# Patient Record
Sex: MALE | Race: WHITE | NOT HISPANIC OR LATINO | Employment: FULL TIME | URBAN - METROPOLITAN AREA
[De-identification: names, ages, dates, MRNs, and addresses within clinical notes are randomized per-mention and may not be internally consistent; named-entity substitution may affect disease eponyms.]

---

## 2024-02-24 ENCOUNTER — HOSPITAL ENCOUNTER (INPATIENT)
Facility: HOSPITAL | Age: 49
LOS: 3 days | Discharge: HOME | DRG: 381 | End: 2024-02-28
Attending: EMERGENCY MEDICINE | Admitting: INTERNAL MEDICINE
Payer: COMMERCIAL

## 2024-02-24 ENCOUNTER — APPOINTMENT (OUTPATIENT)
Dept: CARDIOLOGY | Facility: HOSPITAL | Age: 49
DRG: 381 | End: 2024-02-24
Payer: COMMERCIAL

## 2024-02-24 DIAGNOSIS — G45.8 OTHER TRANSIENT CEREBRAL ISCHEMIC ATTACKS AND RELATED SYNDROMES: ICD-10-CM

## 2024-02-24 DIAGNOSIS — K92.1 GASTROINTESTINAL HEMORRHAGE WITH MELENA: ICD-10-CM

## 2024-02-24 DIAGNOSIS — R55 SYNCOPE, UNSPECIFIED SYNCOPE TYPE: ICD-10-CM

## 2024-02-24 DIAGNOSIS — K92.2 GASTROINTESTINAL HEMORRHAGE, UNSPECIFIED GASTROINTESTINAL HEMORRHAGE TYPE: Primary | ICD-10-CM

## 2024-02-24 DIAGNOSIS — G45.9 TIA (TRANSIENT ISCHEMIC ATTACK): ICD-10-CM

## 2024-02-24 LAB
ABO GROUP (TYPE) IN BLOOD: NORMAL
ABO GROUP (TYPE) IN BLOOD: NORMAL
ALBUMIN SERPL BCP-MCNC: 3.3 G/DL (ref 3.4–5)
ALP SERPL-CCNC: 72 U/L (ref 33–120)
ALT SERPL W P-5'-P-CCNC: 16 U/L (ref 10–52)
ANION GAP SERPL CALC-SCNC: 10 MMOL/L (ref 10–20)
ANTIBODY SCREEN: NORMAL
AST SERPL W P-5'-P-CCNC: 8 U/L (ref 9–39)
BASOPHILS # BLD AUTO: 0.03 X10*3/UL (ref 0–0.1)
BASOPHILS NFR BLD AUTO: 0.2 %
BILIRUB SERPL-MCNC: 0.6 MG/DL (ref 0–1.2)
BUN SERPL-MCNC: 53 MG/DL (ref 6–23)
CALCIUM SERPL-MCNC: 8 MG/DL (ref 8.6–10.3)
CARDIAC TROPONIN I PNL SERPL HS: 71 NG/L (ref 0–20)
CARDIAC TROPONIN I PNL SERPL HS: 96 NG/L (ref 0–20)
CHLORIDE SERPL-SCNC: 112 MMOL/L (ref 98–107)
CO2 SERPL-SCNC: 23 MMOL/L (ref 21–32)
CREAT SERPL-MCNC: 1.11 MG/DL (ref 0.5–1.3)
EGFRCR SERPLBLD CKD-EPI 2021: 81 ML/MIN/1.73M*2
EOSINOPHIL # BLD AUTO: 0 X10*3/UL (ref 0–0.7)
EOSINOPHIL NFR BLD AUTO: 0 %
ERYTHROCYTE [DISTWIDTH] IN BLOOD BY AUTOMATED COUNT: 12.1 % (ref 11.5–14.5)
GLUCOSE SERPL-MCNC: 159 MG/DL (ref 74–99)
HCT VFR BLD AUTO: 30.9 % (ref 41–52)
HCT VFR BLD AUTO: 31.3 % (ref 41–52)
HGB BLD-MCNC: 10.3 G/DL (ref 13.5–17.5)
HGB BLD-MCNC: 10.7 G/DL (ref 13.5–17.5)
HOLD SPECIMEN: NORMAL
IMM GRANULOCYTES # BLD AUTO: 0.11 X10*3/UL (ref 0–0.7)
IMM GRANULOCYTES NFR BLD AUTO: 0.7 % (ref 0–0.9)
INR PPP: 1.3 (ref 0.9–1.1)
LACTATE SERPL-SCNC: 1.9 MMOL/L (ref 0.4–2)
LACTATE SERPL-SCNC: 2.3 MMOL/L (ref 0.4–2)
LYMPHOCYTES # BLD AUTO: 1.61 X10*3/UL (ref 1.2–4.8)
LYMPHOCYTES NFR BLD AUTO: 10.6 %
MCH RBC QN AUTO: 31.9 PG (ref 26–34)
MCHC RBC AUTO-ENTMCNC: 34.2 G/DL (ref 32–36)
MCV RBC AUTO: 93 FL (ref 80–100)
MONOCYTES # BLD AUTO: 0.69 X10*3/UL (ref 0.1–1)
MONOCYTES NFR BLD AUTO: 4.5 %
NEUTROPHILS # BLD AUTO: 12.76 X10*3/UL (ref 1.2–7.7)
NEUTROPHILS NFR BLD AUTO: 84 %
NRBC BLD-RTO: 0 /100 WBCS (ref 0–0)
PLATELET # BLD AUTO: 324 X10*3/UL (ref 150–450)
POTASSIUM SERPL-SCNC: 4.3 MMOL/L (ref 3.5–5.3)
PROT SERPL-MCNC: 5.5 G/DL (ref 6.4–8.2)
PROTHROMBIN TIME: 14.1 SECONDS (ref 9.8–12.8)
RBC # BLD AUTO: 3.35 X10*6/UL (ref 4.5–5.9)
RH FACTOR (ANTIGEN D): NORMAL
RH FACTOR (ANTIGEN D): NORMAL
SODIUM SERPL-SCNC: 141 MMOL/L (ref 136–145)
WBC # BLD AUTO: 15.2 X10*3/UL (ref 4.4–11.3)

## 2024-02-24 PROCEDURE — 36415 COLL VENOUS BLD VENIPUNCTURE: CPT | Performed by: EMERGENCY MEDICINE

## 2024-02-24 PROCEDURE — 99223 1ST HOSP IP/OBS HIGH 75: CPT | Performed by: INTERNAL MEDICINE

## 2024-02-24 PROCEDURE — 96375 TX/PRO/DX INJ NEW DRUG ADDON: CPT

## 2024-02-24 PROCEDURE — 93005 ELECTROCARDIOGRAM TRACING: CPT

## 2024-02-24 PROCEDURE — C9113 INJ PANTOPRAZOLE SODIUM, VIA: HCPCS | Performed by: EMERGENCY MEDICINE

## 2024-02-24 PROCEDURE — 84484 ASSAY OF TROPONIN QUANT: CPT | Performed by: INTERNAL MEDICINE

## 2024-02-24 PROCEDURE — 99285 EMERGENCY DEPT VISIT HI MDM: CPT | Mod: 25

## 2024-02-24 PROCEDURE — 86901 BLOOD TYPING SEROLOGIC RH(D): CPT | Performed by: EMERGENCY MEDICINE

## 2024-02-24 PROCEDURE — 86920 COMPATIBILITY TEST SPIN: CPT

## 2024-02-24 PROCEDURE — G0378 HOSPITAL OBSERVATION PER HR: HCPCS

## 2024-02-24 PROCEDURE — 85025 COMPLETE CBC W/AUTO DIFF WBC: CPT | Performed by: EMERGENCY MEDICINE

## 2024-02-24 PROCEDURE — 85018 HEMOGLOBIN: CPT | Performed by: INTERNAL MEDICINE

## 2024-02-24 PROCEDURE — 2500000004 HC RX 250 GENERAL PHARMACY W/ HCPCS (ALT 636 FOR OP/ED): Performed by: INTERNAL MEDICINE

## 2024-02-24 PROCEDURE — 84484 ASSAY OF TROPONIN QUANT: CPT | Performed by: EMERGENCY MEDICINE

## 2024-02-24 PROCEDURE — 96360 HYDRATION IV INFUSION INIT: CPT

## 2024-02-24 PROCEDURE — 80053 COMPREHEN METABOLIC PANEL: CPT | Performed by: EMERGENCY MEDICINE

## 2024-02-24 PROCEDURE — 83605 ASSAY OF LACTIC ACID: CPT | Performed by: EMERGENCY MEDICINE

## 2024-02-24 PROCEDURE — 96374 THER/PROPH/DIAG INJ IV PUSH: CPT | Mod: 59

## 2024-02-24 PROCEDURE — 2500000004 HC RX 250 GENERAL PHARMACY W/ HCPCS (ALT 636 FOR OP/ED): Performed by: EMERGENCY MEDICINE

## 2024-02-24 PROCEDURE — 82947 ASSAY GLUCOSE BLOOD QUANT: CPT

## 2024-02-24 PROCEDURE — 85610 PROTHROMBIN TIME: CPT | Performed by: EMERGENCY MEDICINE

## 2024-02-24 RX ORDER — LORATADINE 10 MG/1
10 TABLET ORAL DAILY
COMMUNITY

## 2024-02-24 RX ORDER — ASPIRIN 325 MG
325 TABLET, DELAYED RELEASE (ENTERIC COATED) ORAL NIGHTLY
COMMUNITY
End: 2024-02-28 | Stop reason: HOSPADM

## 2024-02-24 RX ORDER — PANTOPRAZOLE SODIUM 40 MG/10ML
40 INJECTION, POWDER, LYOPHILIZED, FOR SOLUTION INTRAVENOUS ONCE
Status: COMPLETED | OUTPATIENT
Start: 2024-02-24 | End: 2024-02-24

## 2024-02-24 RX ORDER — ONDANSETRON HYDROCHLORIDE 2 MG/ML
4 INJECTION, SOLUTION INTRAVENOUS EVERY 6 HOURS PRN
Status: DISCONTINUED | OUTPATIENT
Start: 2024-02-24 | End: 2024-02-28 | Stop reason: HOSPADM

## 2024-02-24 RX ORDER — PANTOPRAZOLE SODIUM 40 MG/10ML
40 INJECTION, POWDER, LYOPHILIZED, FOR SOLUTION INTRAVENOUS 2 TIMES DAILY
Status: DISCONTINUED | OUTPATIENT
Start: 2024-02-24 | End: 2024-02-28

## 2024-02-24 RX ORDER — ACETAMINOPHEN 325 MG/1
650 TABLET ORAL EVERY 6 HOURS PRN
Status: DISCONTINUED | OUTPATIENT
Start: 2024-02-24 | End: 2024-02-28 | Stop reason: HOSPADM

## 2024-02-24 RX ORDER — SODIUM CHLORIDE 9 MG/ML
125 INJECTION, SOLUTION INTRAVENOUS CONTINUOUS
Status: DISCONTINUED | OUTPATIENT
Start: 2024-02-24 | End: 2024-02-26

## 2024-02-24 RX ADMIN — SODIUM CHLORIDE 1000 ML: 9 INJECTION, SOLUTION INTRAVENOUS at 21:25

## 2024-02-24 RX ADMIN — SODIUM CHLORIDE, POTASSIUM CHLORIDE, SODIUM LACTATE AND CALCIUM CHLORIDE 1000 ML: 600; 310; 30; 20 INJECTION, SOLUTION INTRAVENOUS at 16:36

## 2024-02-24 RX ADMIN — PANTOPRAZOLE SODIUM 40 MG: 40 INJECTION, POWDER, FOR SOLUTION INTRAVENOUS at 16:34

## 2024-02-24 SDOH — SOCIAL STABILITY: SOCIAL INSECURITY: ARE THERE ANY APPARENT SIGNS OF INJURIES/BEHAVIORS THAT COULD BE RELATED TO ABUSE/NEGLECT?: NO

## 2024-02-24 SDOH — SOCIAL STABILITY: SOCIAL INSECURITY: DOES ANYONE TRY TO KEEP YOU FROM HAVING/CONTACTING OTHER FRIENDS OR DOING THINGS OUTSIDE YOUR HOME?: NO

## 2024-02-24 SDOH — SOCIAL STABILITY: SOCIAL INSECURITY: HAS ANYONE EVER THREATENED TO HURT YOUR FAMILY OR YOUR PETS?: NO

## 2024-02-24 SDOH — SOCIAL STABILITY: SOCIAL INSECURITY: HAVE YOU HAD THOUGHTS OF HARMING ANYONE ELSE?: NO

## 2024-02-24 SDOH — SOCIAL STABILITY: SOCIAL INSECURITY: DO YOU FEEL UNSAFE GOING BACK TO THE PLACE WHERE YOU ARE LIVING?: NO

## 2024-02-24 SDOH — SOCIAL STABILITY: SOCIAL INSECURITY: WERE YOU ABLE TO COMPLETE ALL THE BEHAVIORAL HEALTH SCREENINGS?: YES

## 2024-02-24 SDOH — SOCIAL STABILITY: SOCIAL INSECURITY: ABUSE: ADULT

## 2024-02-24 SDOH — SOCIAL STABILITY: SOCIAL INSECURITY: DO YOU FEEL ANYONE HAS EXPLOITED OR TAKEN ADVANTAGE OF YOU FINANCIALLY OR OF YOUR PERSONAL PROPERTY?: NO

## 2024-02-24 SDOH — SOCIAL STABILITY: SOCIAL INSECURITY: ARE YOU OR HAVE YOU BEEN THREATENED OR ABUSED PHYSICALLY, EMOTIONALLY, OR SEXUALLY BY ANYONE?: NO

## 2024-02-24 ASSESSMENT — LIFESTYLE VARIABLES
AUDIT-C TOTAL SCORE: 1
HOW MANY STANDARD DRINKS CONTAINING ALCOHOL DO YOU HAVE ON A TYPICAL DAY: 1 OR 2
AUDIT-C TOTAL SCORE: 1
SKIP TO QUESTIONS 9-10: 1
HOW OFTEN DO YOU HAVE A DRINK CONTAINING ALCOHOL: MONTHLY OR LESS
HOW OFTEN DO YOU HAVE 6 OR MORE DRINKS ON ONE OCCASION: NEVER

## 2024-02-24 ASSESSMENT — COGNITIVE AND FUNCTIONAL STATUS - GENERAL
MOVING TO AND FROM BED TO CHAIR: A LOT
DAILY ACTIVITIY SCORE: 13
MOVING FROM LYING ON BACK TO SITTING ON SIDE OF FLAT BED WITH BEDRAILS: A LITTLE
EATING MEALS: A LITTLE
DRESSING REGULAR LOWER BODY CLOTHING: A LOT
TOILETING: A LOT
HELP NEEDED FOR BATHING: A LOT
WALKING IN HOSPITAL ROOM: A LOT
STANDING UP FROM CHAIR USING ARMS: A LOT
PATIENT BASELINE BEDBOUND: NO
DRESSING REGULAR UPPER BODY CLOTHING: A LOT
TURNING FROM BACK TO SIDE WHILE IN FLAT BAD: A LOT
CLIMB 3 TO 5 STEPS WITH RAILING: A LOT
MOBILITY SCORE: 13
PERSONAL GROOMING: A LOT

## 2024-02-24 ASSESSMENT — ACTIVITIES OF DAILY LIVING (ADL)
HEARING - LEFT EAR: FUNCTIONAL
HEARING - RIGHT EAR: FUNCTIONAL
LACK_OF_TRANSPORTATION: NO
BATHING: INDEPENDENT
TOILETING: NEEDS ASSISTANCE
DRESSING YOURSELF: INDEPENDENT
JUDGMENT_ADEQUATE_SAFELY_COMPLETE_DAILY_ACTIVITIES: YES
PATIENT'S MEMORY ADEQUATE TO SAFELY COMPLETE DAILY ACTIVITIES?: YES
GROOMING: INDEPENDENT
ADEQUATE_TO_COMPLETE_ADL: YES
FEEDING YOURSELF: INDEPENDENT
WALKS IN HOME: INDEPENDENT

## 2024-02-24 ASSESSMENT — PAIN - FUNCTIONAL ASSESSMENT
PAIN_FUNCTIONAL_ASSESSMENT: 0-10
PAIN_FUNCTIONAL_ASSESSMENT: 0-10

## 2024-02-24 ASSESSMENT — PAIN SCALES - GENERAL
PAINLEVEL_OUTOF10: 0 - NO PAIN

## 2024-02-24 ASSESSMENT — COLUMBIA-SUICIDE SEVERITY RATING SCALE - C-SSRS
2. HAVE YOU ACTUALLY HAD ANY THOUGHTS OF KILLING YOURSELF?: NO
1. IN THE PAST MONTH, HAVE YOU WISHED YOU WERE DEAD OR WISHED YOU COULD GO TO SLEEP AND NOT WAKE UP?: NO
6. HAVE YOU EVER DONE ANYTHING, STARTED TO DO ANYTHING, OR PREPARED TO DO ANYTHING TO END YOUR LIFE?: NO

## 2024-02-24 NOTE — ED PROVIDER NOTES
Limitations to History: None     HPI:      Carson Art Jr. is a 49 y.o. with dark tarry stools since last night.  He said he began feeling unwell last night had 4 or 5 dark bowel movements and syncopized twice this morning.  Patient says has been taking a decent amount of ibuprofen secondary to some dental work but stopped taking it a few weeks ago.  He is never had dark stools before.  Denies any abdominal pain, says he feels lightheaded and dizzy.  He syncopized coming back from the bathroom and then syncopized twice for squad as well.     Additional History Obtained from: EMS and father who is at bedside    ------------------------------------------------------------------------------------------------------------------------------------------    VS: /69   Pulse (!) 108   Resp 15   SpO2 99%     Physical Exam:  Gen: Alert, pale appearing  Head/Neck: NCAT, neck w/ FROM  Eyes: EOMI, PERRL, anicteric sclerae, noninjected conjunctivae  Mouth:  MMM, no OP lesions noted  Heart: Tachycardic with a regular rhythm, no murmurs  Lungs: CTA b/l no RRW, no increased work of breathing  Abdomen: soft, NT, ND, no HSM, no palpable masses  : Dark tarry stool present on rectal exam  Musculoskeletal: no joint swelling noted  Extremities: WWP, no c/c/e, cap refill <2sec  Neurologic: Alert, symmetrical facies, phonates clearly, moves all extremities equally, responsive to touch, ambulates normally       ------------------------------------------------------------------------------------------------------------------------------------------    Medical Decision Making: Patient presents emergency room with concerns for GI bleed.  The patient has dark tarry stools could be secondary to recent increase in ibuprofen use.  His hemoglobin is 10, however he does have multiple syncopal episodes at home so will be admitted for evaluation of this GI bleed.  He does not have any bright red blood per rectum I do not think he has any  indications for a CTA at this time.  2 units were put on hold for the patient I do not think he needs a transfusion immediately at this time but he is open to that if needed.  Protonix was given, he is not on any blood thinners other than aspirin.    ED Course as of 02/24/24 1738   Sat Feb 24, 2024   1620 EKG interpreted by me at 4:20 PM shows sinus tachycardia with a rate of 124, normal axis intervals ST segments and T waves otherwise [RG]      ED Course User Index  [RG] Star Sánchez MD         Diagnoses as of 02/24/24 1738   Gastrointestinal hemorrhage, unspecified gastrointestinal hemorrhage type       Medications   pantoprazole (ProtoNix) injection 40 mg (40 mg intravenous Given 2/24/24 1634)   lactated Ringer's bolus 1,000 mL (1,000 mL intravenous New Bag 2/24/24 1636)         Discussion of Management with Other Providers:   I discussed the patient/results with: admitting doc       Star Sánchez MD  02/24/24 1738

## 2024-02-24 NOTE — H&P
"Hospital Medicine History & Physical    Patient Name: Carson Art Jr.   YOB: 1975    Subjective:    Carson Art Jr. is a 49 y.o. male who presents to the hospital with complaints of multiple syncopal episodes in the setting of melena.  Patient states overnight he was feeling an upset stomach.  He had several bowel movements.  This morning he was returning to bed after a bowel movement when he had a syncopal episode.  He noticed his stools were black and tarry.  Squad was called.  He apparently had 1-2 other syncopal episodes thereafter.  In the emergency department patient was found to have relatively stable hemodynamics, he was tachycardic.  His hemoglobin is down to 10.7 without any baseline reference.  He does endorse taking 800 mg of ibuprofen daily for about 2 weeks for dental pain, last dose was approximately 10 to 14 days ago.  He is not on any blood thinners.  No history of GERD.  Patient denies any chest pain, palpitations.    A 10 point ROS was completed and is negative expect as stated in HPI.     Past Medical History:  Venous insufficiency    Past Surgical History:  Venous ablation    Social History: Tobacco - Never, Alcohol - social drinker, Recreational Drugs - none    Family History: family history is not on file.    Objective:    /89   Pulse (!) 114   Temp 37 °C (98.6 °F) (Temporal)   Resp (!) 30   Ht 1.905 m (6' 3\")   Wt 104 kg (230 lb)   SpO2 100%   BMI 28.75 kg/m²     Physical Exam:    GENERAL: Well developed and in no apparent distress. Alert and cooperative.  HEENT: Normocephalic and atraumatic  CARDIOVASCULAR: Tachycardic  RESPIRATORY: Clear to auscultation b/l. No wheezes, rales or rhonchi. Normal effort.   ABDOMEN: Soft, non-tender. Not distended. No rebound or guarding.  EXTREMITIES: Venous stasis skin changes  NEUROLOGICAL: A&O X 3. CN II-XII are grossly intact. No focal deficits.   SKIN: Warm and dry, no lesions, no rashes.  PSYCH: Appropriate mood and " affect.      Assessment/Plan:    Patient is a 49-year-old male without any significant medical history who presents with multiple syncopal episodes in the setting of stomach upset and black-colored stools.  His labs showed decreased hemoglobin as well as increased BUN with a normal creatinine.  He is tachycardic.  Recently was on a course of NSAIDs.    Upper GI bleed  Syncopal episode  Anemia  Lactic acidosis  Non-MI troponin elevation  Obesity    At this time we will continue with IV Protonix 40 mg daily.  Will aggressively resuscitate with intravenous crystalloid fluid.  Will trend H&H.  Maintain 2 large-bore IVs.  He has been consented for blood in the emergency department and units were ordered on hold.  Consult GI.  For now he can be on a clear liquid diet, empirically make him n.p.o. after midnight  Believe his troponin elevation is secondary to the above issues, no chest pain or concerning EKG findings.  Will go ahead and trend them out.        DVT Prophylaxis: SCDs only  Code Status: Full code  Disposition: Admit to telemetry      Arash Diallo,   [facility]

## 2024-02-24 NOTE — PROGRESS NOTES
Pharmacy Medication History Review    Carson Art Jr. is a 49 y.o. male admitted for GI bleed. Pharmacy reviewed the patient's [id number] medications and allergies for accuracy.    The list below reflectives the updated PTA list. Please review each medication in order reconciliation for additional clarification and justification.  Prior to Admission medications    Medication Sig Start Date End Date Taking? Authorizing Provider   aspirin 325 mg EC tablet Take 1 tablet (325 mg) by mouth once daily at bedtime.    Historical Provider, MD   loratadine (Claritin) 10 mg tablet Take 1 tablet (10 mg) by mouth once daily.    Historical Provider, MD        The list below reflectives the updated allergy list. Please review each documented allergy for additional clarification and justification.  Allergies  Reviewed by Mary Forde RN on 2/24/2024   No Known Allergies         Below are additional concerns with the patient's PTA list.      Anabelle Martin CPhT

## 2024-02-24 NOTE — ED TRIAGE NOTES
Black tarry stools today with 3 syncopal episodes, only medications is 1 [id number] mg day, took motrin daily for about 1 week for dental procedure. Last motin 2/17, incpont 2 large black stools

## 2024-02-25 ENCOUNTER — APPOINTMENT (OUTPATIENT)
Dept: RADIOLOGY | Facility: HOSPITAL | Age: 49
DRG: 381 | End: 2024-02-25
Payer: COMMERCIAL

## 2024-02-25 PROBLEM — K92.2 GASTROINTESTINAL HEMORRHAGE, UNSPECIFIED GASTROINTESTINAL HEMORRHAGE TYPE: Status: ACTIVE | Noted: 2024-02-25

## 2024-02-25 LAB
ANION GAP SERPL CALC-SCNC: 11 MMOL/L (ref 10–20)
BNP SERPL-MCNC: 17 PG/ML (ref 0–99)
BUN SERPL-MCNC: 43 MG/DL (ref 6–23)
CALCIUM SERPL-MCNC: 7.8 MG/DL (ref 8.6–10.3)
CARDIAC TROPONIN I PNL SERPL HS: 43 NG/L (ref 0–20)
CHLORIDE SERPL-SCNC: 116 MMOL/L (ref 98–107)
CHOLEST SERPL-MCNC: 138 MG/DL (ref 0–199)
CHOLESTEROL/HDL RATIO: 6.3
CO2 SERPL-SCNC: 22 MMOL/L (ref 21–32)
CREAT SERPL-MCNC: 0.96 MG/DL (ref 0.5–1.3)
EGFRCR SERPLBLD CKD-EPI 2021: >90 ML/MIN/1.73M*2
ERYTHROCYTE [DISTWIDTH] IN BLOOD BY AUTOMATED COUNT: 12.5 % (ref 11.5–14.5)
EST. AVERAGE GLUCOSE BLD GHB EST-MCNC: 105 MG/DL
GLUCOSE BLD MANUAL STRIP-MCNC: 101 MG/DL (ref 74–99)
GLUCOSE BLD MANUAL STRIP-MCNC: 109 MG/DL (ref 74–99)
GLUCOSE BLD MANUAL STRIP-MCNC: 127 MG/DL (ref 74–99)
GLUCOSE BLD MANUAL STRIP-MCNC: 134 MG/DL (ref 74–99)
GLUCOSE SERPL-MCNC: 126 MG/DL (ref 74–99)
HBA1C MFR BLD: 5.3 %
HCT VFR BLD AUTO: 23.6 % (ref 41–52)
HCT VFR BLD AUTO: 27.2 % (ref 41–52)
HCT VFR BLD AUTO: 28.7 % (ref 41–52)
HDLC SERPL-MCNC: 21.8 MG/DL
HGB BLD-MCNC: 7.8 G/DL (ref 13.5–17.5)
HGB BLD-MCNC: 9 G/DL (ref 13.5–17.5)
HGB BLD-MCNC: 9.4 G/DL (ref 13.5–17.5)
LDLC SERPL CALC-MCNC: 85 MG/DL
MCH RBC QN AUTO: 31.6 PG (ref 26–34)
MCHC RBC AUTO-ENTMCNC: 33.1 G/DL (ref 32–36)
MCV RBC AUTO: 95 FL (ref 80–100)
NON HDL CHOLESTEROL: 116 MG/DL (ref 0–149)
NRBC BLD-RTO: 0 /100 WBCS (ref 0–0)
PLATELET # BLD AUTO: 287 X10*3/UL (ref 150–450)
POTASSIUM SERPL-SCNC: 4.2 MMOL/L (ref 3.5–5.3)
RBC # BLD AUTO: 2.85 X10*6/UL (ref 4.5–5.9)
SODIUM SERPL-SCNC: 145 MMOL/L (ref 136–145)
TRIGL SERPL-MCNC: 158 MG/DL (ref 0–149)
VLDL: 32 MG/DL (ref 0–40)
WBC # BLD AUTO: 16 X10*3/UL (ref 4.4–11.3)

## 2024-02-25 PROCEDURE — 2500000004 HC RX 250 GENERAL PHARMACY W/ HCPCS (ALT 636 FOR OP/ED)

## 2024-02-25 PROCEDURE — 83036 HEMOGLOBIN GLYCOSYLATED A1C: CPT | Performed by: INTERNAL MEDICINE

## 2024-02-25 PROCEDURE — 70450 CT HEAD/BRAIN W/O DYE: CPT

## 2024-02-25 PROCEDURE — 99223 1ST HOSP IP/OBS HIGH 75: CPT | Performed by: PHYSICIAN ASSISTANT

## 2024-02-25 PROCEDURE — 2500000001 HC RX 250 WO HCPCS SELF ADMINISTERED DRUGS (ALT 637 FOR MEDICARE OP): Performed by: INTERNAL MEDICINE

## 2024-02-25 PROCEDURE — 36415 COLL VENOUS BLD VENIPUNCTURE: CPT | Performed by: INTERNAL MEDICINE

## 2024-02-25 PROCEDURE — 84484 ASSAY OF TROPONIN QUANT: CPT | Performed by: INTERNAL MEDICINE

## 2024-02-25 PROCEDURE — C9113 INJ PANTOPRAZOLE SODIUM, VIA: HCPCS | Performed by: INTERNAL MEDICINE

## 2024-02-25 PROCEDURE — 80061 LIPID PANEL: CPT | Performed by: INTERNAL MEDICINE

## 2024-02-25 PROCEDURE — 85014 HEMATOCRIT: CPT | Performed by: INTERNAL MEDICINE

## 2024-02-25 PROCEDURE — 99223 1ST HOSP IP/OBS HIGH 75: CPT | Performed by: PSYCHIATRY & NEUROLOGY

## 2024-02-25 PROCEDURE — 70450 CT HEAD/BRAIN W/O DYE: CPT | Performed by: RADIOLOGY

## 2024-02-25 PROCEDURE — 94760 N-INVAS EAR/PLS OXIMETRY 1: CPT

## 2024-02-25 PROCEDURE — 85027 COMPLETE CBC AUTOMATED: CPT | Performed by: INTERNAL MEDICINE

## 2024-02-25 PROCEDURE — 2500000004 HC RX 250 GENERAL PHARMACY W/ HCPCS (ALT 636 FOR OP/ED): Performed by: INTERNAL MEDICINE

## 2024-02-25 PROCEDURE — 1200000002 HC GENERAL ROOM WITH TELEMETRY DAILY

## 2024-02-25 PROCEDURE — 82947 ASSAY GLUCOSE BLOOD QUANT: CPT

## 2024-02-25 PROCEDURE — 83880 ASSAY OF NATRIURETIC PEPTIDE: CPT | Performed by: INTERNAL MEDICINE

## 2024-02-25 PROCEDURE — 80048 BASIC METABOLIC PNL TOTAL CA: CPT | Performed by: INTERNAL MEDICINE

## 2024-02-25 PROCEDURE — 99233 SBSQ HOSP IP/OBS HIGH 50: CPT | Performed by: INTERNAL MEDICINE

## 2024-02-25 RX ORDER — HYDRALAZINE HYDROCHLORIDE 25 MG/1
25 TABLET, FILM COATED ORAL EVERY 6 HOURS PRN
Status: DISCONTINUED | OUTPATIENT
Start: 2024-02-27 | End: 2024-02-26

## 2024-02-25 RX ORDER — HYDRALAZINE HYDROCHLORIDE 20 MG/ML
10 INJECTION INTRAMUSCULAR; INTRAVENOUS
Status: DISCONTINUED | OUTPATIENT
Start: 2024-02-25 | End: 2024-02-26

## 2024-02-25 RX ORDER — POLYETHYLENE GLYCOL 3350 17 G/17G
17 POWDER, FOR SOLUTION ORAL DAILY PRN
Status: DISCONTINUED | OUTPATIENT
Start: 2024-02-25 | End: 2024-02-28 | Stop reason: HOSPADM

## 2024-02-25 RX ORDER — ATORVASTATIN CALCIUM 80 MG/1
80 TABLET, FILM COATED ORAL NIGHTLY
Status: DISCONTINUED | OUTPATIENT
Start: 2024-02-25 | End: 2024-02-26

## 2024-02-25 RX ORDER — ASPIRIN 81 MG/1
81 TABLET ORAL DAILY
Status: DISCONTINUED | OUTPATIENT
Start: 2024-02-26 | End: 2024-02-26

## 2024-02-25 RX ORDER — LABETALOL HYDROCHLORIDE 5 MG/ML
10 INJECTION, SOLUTION INTRAVENOUS EVERY 10 MIN PRN
Status: DISCONTINUED | OUTPATIENT
Start: 2024-02-25 | End: 2024-02-26

## 2024-02-25 RX ADMIN — SODIUM CHLORIDE 125 ML/HR: 9 INJECTION, SOLUTION INTRAVENOUS at 00:30

## 2024-02-25 RX ADMIN — ONDANSETRON 4 MG: 2 INJECTION INTRAMUSCULAR; INTRAVENOUS at 00:20

## 2024-02-25 RX ADMIN — ATORVASTATIN CALCIUM 80 MG: 80 TABLET, FILM COATED ORAL at 20:53

## 2024-02-25 RX ADMIN — ATORVASTATIN CALCIUM 80 MG: 80 TABLET, FILM COATED ORAL at 00:43

## 2024-02-25 RX ADMIN — PANTOPRAZOLE SODIUM 40 MG: 40 INJECTION, POWDER, FOR SOLUTION INTRAVENOUS at 20:52

## 2024-02-25 RX ADMIN — PANTOPRAZOLE SODIUM 40 MG: 40 INJECTION, POWDER, FOR SOLUTION INTRAVENOUS at 09:45

## 2024-02-25 SDOH — SOCIAL STABILITY: SOCIAL INSECURITY: ARE THERE ANY APPARENT SIGNS OF INJURIES/BEHAVIORS THAT COULD BE RELATED TO ABUSE/NEGLECT?: NO

## 2024-02-25 SDOH — SOCIAL STABILITY: SOCIAL INSECURITY: DO YOU FEEL UNSAFE GOING BACK TO THE PLACE WHERE YOU ARE LIVING?: NO

## 2024-02-25 SDOH — SOCIAL STABILITY: SOCIAL INSECURITY: HAS ANYONE EVER THREATENED TO HURT YOUR FAMILY OR YOUR PETS?: NO

## 2024-02-25 SDOH — SOCIAL STABILITY: SOCIAL INSECURITY: DO YOU FEEL ANYONE HAS EXPLOITED OR TAKEN ADVANTAGE OF YOU FINANCIALLY OR OF YOUR PERSONAL PROPERTY?: NO

## 2024-02-25 SDOH — SOCIAL STABILITY: SOCIAL INSECURITY: HAVE YOU HAD THOUGHTS OF HARMING ANYONE ELSE?: NO

## 2024-02-25 SDOH — SOCIAL STABILITY: SOCIAL INSECURITY: WERE YOU ABLE TO COMPLETE ALL THE BEHAVIORAL HEALTH SCREENINGS?: YES

## 2024-02-25 SDOH — SOCIAL STABILITY: SOCIAL INSECURITY: DOES ANYONE TRY TO KEEP YOU FROM HAVING/CONTACTING OTHER FRIENDS OR DOING THINGS OUTSIDE YOUR HOME?: NO

## 2024-02-25 SDOH — SOCIAL STABILITY: SOCIAL INSECURITY: ABUSE: ADULT

## 2024-02-25 SDOH — SOCIAL STABILITY: SOCIAL INSECURITY: ARE YOU OR HAVE YOU BEEN THREATENED OR ABUSED PHYSICALLY, EMOTIONALLY, OR SEXUALLY BY ANYONE?: NO

## 2024-02-25 ASSESSMENT — ENCOUNTER SYMPTOMS
TROUBLE SWALLOWING: 0
WHEEZING: 0
APPETITE CHANGE: 0
NUMBNESS: 0
CHILLS: 0
EYE PAIN: 0
SPEECH DIFFICULTY: 1
DYSURIA: 0
JOINT SWELLING: 0
ARTHRALGIAS: 0
SHORTNESS OF BREATH: 0
HEADACHES: 0
SORE THROAT: 0
WEAKNESS: 0
FEVER: 0
DIZZINESS: 0
UNEXPECTED WEIGHT CHANGE: 0
HEMATURIA: 0
COUGH: 0
CONFUSION: 0
MYALGIAS: 0

## 2024-02-25 ASSESSMENT — LIFESTYLE VARIABLES
AUDIT-C TOTAL SCORE: 1
AUDIT-C TOTAL SCORE: 1
SKIP TO QUESTIONS 9-10: 1
HOW OFTEN DO YOU HAVE A DRINK CONTAINING ALCOHOL: MONTHLY OR LESS
HOW OFTEN DO YOU HAVE 6 OR MORE DRINKS ON ONE OCCASION: NEVER
HOW MANY STANDARD DRINKS CONTAINING ALCOHOL DO YOU HAVE ON A TYPICAL DAY: 1 OR 2

## 2024-02-25 ASSESSMENT — PATIENT HEALTH QUESTIONNAIRE - PHQ9
1. LITTLE INTEREST OR PLEASURE IN DOING THINGS: NOT AT ALL
SUM OF ALL RESPONSES TO PHQ9 QUESTIONS 1 & 2: 0
2. FEELING DOWN, DEPRESSED OR HOPELESS: NOT AT ALL

## 2024-02-25 ASSESSMENT — COGNITIVE AND FUNCTIONAL STATUS - GENERAL
MOVING TO AND FROM BED TO CHAIR: A LITTLE
TURNING FROM BACK TO SIDE WHILE IN FLAT BAD: A LOT
CLIMB 3 TO 5 STEPS WITH RAILING: A LOT
MOVING FROM LYING ON BACK TO SITTING ON SIDE OF FLAT BED WITH BEDRAILS: A LITTLE
DRESSING REGULAR UPPER BODY CLOTHING: A LOT
PERSONAL GROOMING: A LITTLE
TOILETING: A LOT
DRESSING REGULAR LOWER BODY CLOTHING: A LOT
TURNING FROM BACK TO SIDE WHILE IN FLAT BAD: A LITTLE
STANDING UP FROM CHAIR USING ARMS: A LOT
DRESSING REGULAR UPPER BODY CLOTHING: A LITTLE
HELP NEEDED FOR BATHING: A LOT
HELP NEEDED FOR BATHING: A LOT
WALKING IN HOSPITAL ROOM: A LOT
MOBILITY SCORE: 17
DAILY ACTIVITIY SCORE: 17
DAILY ACTIVITIY SCORE: 12
CLIMB 3 TO 5 STEPS WITH RAILING: A LOT
MOVING FROM LYING ON BACK TO SITTING ON SIDE OF FLAT BED WITH BEDRAILS: A LITTLE
MOBILITY SCORE: 13
MOVING TO AND FROM BED TO CHAIR: A LOT
PERSONAL GROOMING: A LOT
TOILETING: A LITTLE
WALKING IN HOSPITAL ROOM: A LITTLE
DRESSING REGULAR LOWER BODY CLOTHING: A LOT
STANDING UP FROM CHAIR USING ARMS: A LITTLE
EATING MEALS: A LOT

## 2024-02-25 ASSESSMENT — PAIN SCALES - GENERAL
PAINLEVEL_OUTOF10: 0 - NO PAIN
PAINLEVEL_OUTOF10: 0 - NO PAIN

## 2024-02-25 ASSESSMENT — PAIN - FUNCTIONAL ASSESSMENT
PAIN_FUNCTIONAL_ASSESSMENT: 0-10
PAIN_FUNCTIONAL_ASSESSMENT: 0-10

## 2024-02-25 NOTE — PROGRESS NOTES
2344 Pt with new onset expressive aphasia while changing. Neuro check performed and intact. Hospitalist notified. Stroke alert called.

## 2024-02-25 NOTE — CARE PLAN
Problem: Pain  Goal: My pain/discomfort is manageable  Outcome: Progressing     Problem: Safety  Goal: Patient will be injury free during hospitalization  Outcome: Progressing  Goal: I will remain free of falls  Outcome: Progressing     Problem: Daily Care  Goal: Daily care needs are met  Outcome: Progressing     Problem: Psychosocial Needs  Goal: Demonstrates ability to cope with hospitalization/illness  Outcome: Progressing  Goal: Collaborate with me, my family, and caregiver to identify my specific goals  Outcome: Progressing  Flowsheets (Taken 2/24/2024 2038)  Cultural Requests During Hospitalization: none  Spiritual Requests During Hospitalization: none     Problem: Discharge Barriers  Goal: My discharge needs are met  Outcome: Progressing     Problem: Skin  Goal: Decreased wound size/increased tissue granulation at next dressing change  Outcome: Progressing  Flowsheets (Taken 2/24/2024 2351)  Decreased wound size/increased tissue granulation at next dressing change: Promote sleep for wound healing  Goal: Participates in plan/prevention/treatment measures  Outcome: Progressing  Flowsheets (Taken 2/24/2024 2351)  Participates in plan/prevention/treatment measures: Elevate heels  Goal: Prevent/manage excess moisture  Outcome: Progressing  Flowsheets (Taken 2/24/2024 2351)  Prevent/manage excess moisture: Cleanse incontinence/protect with barrier cream  Goal: Prevent/minimize sheer/friction injuries  Outcome: Progressing  Flowsheets (Taken 2/24/2024 2351)  Prevent/minimize sheer/friction injuries: Use pull sheet  Goal: Promote/optimize nutrition  Outcome: Progressing  Flowsheets (Taken 2/24/2024 2351)  Promote/optimize nutrition: Discuss with provider if NPO > 2 days  Goal: Promote skin healing  Outcome: Progressing  Flowsheets (Taken 2/24/2024 2351)  Promote skin healing: Assess skin/pad under line(s)/device(s)   The patient's goals for the shift include      The clinical goals for the shift include H/H remains  stable

## 2024-02-25 NOTE — SIGNIFICANT EVENT
"Stroke team note:    Called by nursing for acute onset expressive aphasia.  Last known well 10:30 pm.  Patient had transient expressive aphasia, speaking \"gibberish\", but lasting only minutes.  No other motor, sensory, visual deficits.    On exam NIH zero.  Stat CT brain without ordered and reviewed without obvious bleed.  Will place stroke order set, check imaging, and neuro consult.  Given 100% resolution of symptoms, and concern for acute GI bleed will not consider TNK at this time.      Also-  Patient continues to have hematemesis, and repeat H&H scheduled in 1 hour.  Vitals remaining stable.  If Hgb stable will give aspirin, but if dropping may need to withhold aspirin.      02/25/24 at 12:13 AM - Winston Hobbs MD    "

## 2024-02-25 NOTE — CONSULTS
Department of Internal Medicine  Gastroenterology  Consult note      Reason for Consult: upper GIB, multiple syncopal episodes, NSIAD use, melena     Chief Complaint: melena and syncope    History Obtained from: chart review and patient reports    History of Present Illness      The patient is a 49 y.o. male with signficant past medical history of venous insufficiency who presents for syncope and melena.    He states he started feeling unwell Friday during the mid afternoon.  Saturday morning he started having melena.  He states he had 20-30 episodes during the day and overnight.  He stated most were low volume liquid black diarrhea.  When EMS was called later in the day he had 2 episodes of syncope prior to arrival and 1 in the ER.  He denies hitting his head.  Otherwise he has had some mild nausea with 1 episode of vomiting last night which she was told was black.  No bright red blood.  He denies any abdominal pain, odynophagia, dysphagia, acid reflux, constipation, or hematochezia.  He typically moves his bowels 3 times daily of normal caliber and color.    Of note he had some teeth pulled about 3 weeks ago and was taking high-dose ibuprofen every day as well as a daily aspirin.  He was also on amoxicillin and Norco at that time.    Denies use of tobacco, alcohol, and recreational drugs. No blood thinner or recent changes in medication. Denies use of daily NSAIDs and Tylenol.  He works as a pharmacist at [employer]    No history of abdominal surgeries    Allergies  Patient has no known allergies.    Current Medication    Current Facility-Administered Medications:     acetaminophen (Tylenol) tablet 650 mg, 650 mg, oral, q6h PRN, Arash Diallo DO    [Held by provider] aspirin EC tablet 81 mg, 81 mg, oral, Daily, Winston Hobbs MD    atorvastatin (Lipitor) tablet 80 mg, 80 mg, oral, Nightly, Winston Hobbs MD, 80 mg at 02/25/24 0043    hydrALAZINE (Apresoline) injection 10 mg, 10 mg, intravenous, q20 min PRN  **FOLLOWED BY** [START ON 2/27/2024] hydrALAZINE (Apresoline) tablet 25 mg, 25 mg, oral, q6h PRN, Winston Hobbs MD    labetaloL (Normodyne,Trandate) injection 10 mg, 10 mg, intravenous, q10 min PRN, Winston Hobbs MD    ondansetron (Zofran) injection 4 mg, 4 mg, intravenous, q6h PRN, Jose Miguel Terrell DO, 4 mg at 02/25/24 0020    oxygen (O2) therapy, , inhalation, Continuous PRN - O2/gases, Winston Hobbs MD    pantoprazole (ProtoNix) injection 40 mg, 40 mg, intravenous, BID, Arash Diallo DO    polyethylene glycol (Glycolax, Miralax) packet 17 g, 17 g, oral, Daily PRN, Winston Hobbs MD    sodium chloride 0.9% infusion, 125 mL/hr, intravenous, Continuous, Arash Diallo DO, Last Rate: 125 mL/hr at 02/25/24 0030, 125 mL/hr at 02/25/24 0030    Past Medical History  Active Ambulatory Problems     Diagnosis Date Noted    No Active Ambulatory Problems     Resolved Ambulatory Problems     Diagnosis Date Noted    No Resolved Ambulatory Problems     Past Medical History:   Diagnosis Date    Patient denies medical problems        Past Surgical History  No past surgical history on file.    Family History  No family history on file.  No family history of stomach or colon cancer    Social History  TOBACCO:  reports that he has quit smoking. His smoking use included cigars. He has been exposed to tobacco smoke. He has never used smokeless tobacco.  ETOH:  has no history on file for alcohol use.  DRUGS:  has no history on file for drug use.  MARITAL STATUS:   OCCUPATION:    Review of Systems  Review of Systems   Constitutional:  Negative for appetite change, chills, fever and unexpected weight change.   HENT:  Negative for mouth sores, sore throat and trouble swallowing.    Eyes:  Negative for pain and visual disturbance.   Respiratory:  Negative for cough, shortness of breath and wheezing.    Cardiovascular:  Negative for chest pain.   Genitourinary:  Negative for decreased urine volume, dysuria and  "hematuria.   Musculoskeletal:  Negative for arthralgias, joint swelling and myalgias.        Endorses joint stiffness with the change in weather   Skin:  Negative for pallor and rash.   Neurological:  Positive for syncope. Negative for dizziness, weakness, numbness and headaches.   Psychiatric/Behavioral:  Negative for confusion.        PHYSICAL EXAM  VS: /78   Pulse 104   Temp 36.7 °C (98.1 °F)   Resp 18   Ht 1.905 m (6' 3\")   Wt 104 kg (230 lb)   SpO2 95%   BMI 28.75 kg/m²  Body mass index is 28.75 kg/m².  Physical Exam  Constitutional:       General: He is not in acute distress.     Appearance: Normal appearance. He is obese.   HENT:      Mouth/Throat:      Mouth: Mucous membranes are moist.      Pharynx: Oropharynx is clear.   Eyes:      General: No scleral icterus.     Extraocular Movements: Extraocular movements intact.      Conjunctiva/sclera: Conjunctivae normal.      Pupils: Pupils are equal, round, and reactive to light.   Cardiovascular:      Rate and Rhythm: Normal rate and regular rhythm.      Heart sounds: No murmur heard.  Pulmonary:      Effort: Pulmonary effort is normal.      Breath sounds: No wheezing or rhonchi.   Abdominal:      General: Bowel sounds are normal. There is no distension.      Palpations: Abdomen is soft. There is no mass.      Tenderness: There is no abdominal tenderness.      Hernia: No hernia is present.   Musculoskeletal:         General: No swelling or deformity.   Skin:     General: Skin is warm.      Coloration: Skin is not jaundiced.   Neurological:      General: No focal deficit present.      Mental Status: He is alert and oriented to person, place, and time.   Psychiatric:         Mood and Affect: Mood normal.          DATA  Recent blood work and relevant radiology studies were reviewed and discussed with the patient   Results from last 7 days   Lab Units 02/25/24  0638   WBC AUTO x10*3/uL 16.0*   RBC AUTO x10*6/uL 2.85*   HEMOGLOBIN g/dL 9.0*   HEMATOCRIT % " 27.2*   MCV fL 95   MCHC g/dL 33.1   RDW % 12.5   PLATELETS AUTO x10*3/uL 287       Results from last 72 hours   Lab Units 02/25/24  0638 02/24/24  1617   SODIUM mmol/L 145 141   POTASSIUM mmol/L 4.2 4.3   CHLORIDE mmol/L 116* 112*   CO2 mmol/L 22 23   BUN mg/dL 43* 53*   CREATININE mg/dL 0.96 1.11   CALCIUM mg/dL 7.8* 8.0*   PROTEIN TOTAL g/dL  --  5.5*   BILIRUBIN TOTAL mg/dL  --  0.6   ALK PHOS U/L  --  72   AST U/L  --  8*   ALT U/L  --  16       Results from last 72 hours   Lab Units 02/24/24  1617   INR  1.3*     RADIOLOGY REVIEW  CT brain/head without contrast 2/25/2024  IMPRESSION:  1.  No acute intracranial hemorrhage or acute territorial infarct.    ENDOSCOPIC REVIEW  NA    IMPRESSION/RECOMMENDATIONS    UGIB - hgb(2/25) 9.0, admitted with hgb 10.7, baseline unknown, prior to this admission, last hgb 15 in 2012.   NSAID and ASA use  Concern for TIA - short episode of expressive aphasia. CT head negative    PLAN  We will tentatively schedule for EGD 2/26/2024 pending stroke workup  Agree with pantoprazole 40 mg twice daily  Currently n.p.o. and should maintain if still having hematemesis otherwise clear liquids are okay until midnight than NPO  Continue supportive care per primary team    Discussed with Dr Woodson GI to follow    (Electronically signed byIvy Cameron PA-C on 2/25/2024 at 7:50 AM)    Inpatient consult to gastroenterology  Consult performed by: Ivy Cameron PA-C  Consult ordered by: Arash Diallo DO

## 2024-02-25 NOTE — PROGRESS NOTES
Occupational Therapy                 Therapy Communication Note    Patient Name: Carson Art Jr.  MRN: [id number]  Today's Date: 2/25/2024   Time 0942  Discipline: Occupational Therapy    Missed Visit Reason: Missed Visit Reason:  (Pt pending MRI /c stroke alert called this AM. Will hold eval at this time. Nursing aware.)    Missed Time: Cancel    Comment:

## 2024-02-25 NOTE — PROGRESS NOTES
"Hospital Medicine Daily Progress Note    Patient Name: Carson Art Jr.   YOB: 1975      Subjective:  Carson Art Jr. is a 49 y.o.yo male who is hospital day 2     Patient overnight had a stroke alert called due to transient change in speech. He also had hematemesis. Hgb slightly lower this morning but hemodynamics are fine.     Objective:  Recent labs, imaging, vital signs and notes from other providers were reviewed     /78   Pulse 104   Temp 36.7 °C (98.1 °F)   Resp 18   Ht 1.905 m (6' 3\")   Wt 104 kg (230 lb)   SpO2 95%   BMI 28.75 kg/m²     Physical Exam:     GENERAL: Well developed and in no apparent distress. Alert and cooperative.  HEENT: Normocephalic and atraumatic  CARDIOVASCULAR: Tachycardic  RESPIRATORY: Clear to auscultation b/l. No wheezes, rales or rhonchi. Normal effort.   ABDOMEN: Soft, non-tender. Not distended. No rebound or guarding.  EXTREMITIES: Venous stasis skin changes  NEUROLOGICAL: A&O X 3. No focal deficits.   SKIN: Warm and dry, no lesions, no rashes.  PSYCH: Appropriate mood and affect.        Assessment/Plan:     Patient is a 49-year-old male without any significant medical history who presents with multiple syncopal episodes in the setting of stomach upset and black-colored stools.  His labs showed decreased hemoglobin as well as increased BUN with a normal creatinine.  He was tachycardic. Recently was on a course of NSAIDs.     Upper GI bleed  TIA?  Syncopal episode  Anemia  Lactic acidosis  Non-MI troponin elevation  Obesity     Continue with IV Protonix 40 mg daily. Maintain 2 large-bore IVs.  He has been consented for blood in the emergency department and units were ordered on hold.  Consult GI for EGD tmrw. CLD now and NPO after mn. IVF running  Unclear what happened overnight with regards to speech. NIH 0 now. MRIs and neuro consult pending. Hold ASA for now as I believe the GIB more pressing.   Believe his troponin elevation is secondary to " the above issues, no chest pain or concerning EKG findings. Down trended.            DVT Prophylaxis: SCDs only  Code Status: Full code  Disposition: Tele, anticipate eventual dc to home with no needs       Arash Diallo, DO  [facility]

## 2024-02-25 NOTE — CONSULTS
Inpatient consult to Neurology  Consult performed by: Michael Gorman MD  Consult ordered by: Winston Hobbs MD        History Of Present Illness  Carson Art Jr. is a 49 y.o. male presenting with speech problems.  The patient was in his usual state of health until recently.  The patient states that he had an upset stomach and had several bowel movements.  On the morning of admission, he was returning to bed after bowel movement when he had a loss of consciousness.  He states that his stools were black and tarry.  EMS was called and the patient apparently had 1-2 other syncopal episodes noted.  In the ER the patient was stable but was tachycardic.  His hemoglobin had decreased to 10.7.  The patient states that he has been taking 800 mg of ibuprofen daily for about 2 weeks secondary to dental pain but his last dose was 10 to 14 days ago.  The patient was diagnosed with a GI bleed and GI has been consulted.  The patient is tentatively scheduled for endoscopy on 2/26/2024.  Last evening at 10:30 PM the patient had  A transient episode of difficulty with speech.  He states that it lasted minutes and was back to his baseline.  He denied any associated headache, nausea, vomiting, facial or extremity weakness or numbness or any other neurological problems.  The patient denies any history of stroke, seizure or head trauma.  The patient had a stroke team called.  He had a CT scan brain done that was negative for any acute process.  I did discuss the case with Dr. Hobbs.  The patient was not an IV tPA candidate as he was back to his baseline.  His NIH stroke scale score was 0 at that time.  Currently the patient is back to his baseline and denies any new neurological problems.    Past medical History  Past Medical History:   Diagnosis Date    Patient denies medical problems    The patient's past medical history significant for venous insufficiency.  Surgical History  The patient's past surgical history  "significant for venous ablation.  Social History  Social History     Tobacco Use    Smoking status: Former     Types: Cigars     Passive exposure: Past    Smokeless tobacco: Never   The patient currently does not smoke cigarettes and occasionally drinks alcohol.  He denies illicit drug use lives at home.  Allergies  Patient has no known allergies.  Medications Prior to Admission   Medication Sig Dispense Refill Last Dose    aspirin 325 mg EC tablet Take 1 tablet (325 mg) by mouth once daily at bedtime.   2/23/2024    loratadine (Claritin) 10 mg tablet Take 1 tablet (10 mg) by mouth once daily.   2/23/2024     Family History  The patient's family history is negative for any significant problem.    Review of Systems   Neurological:  Positive for speech difficulty.   All other systems reviewed and are negative.        Neurological Exam  Physical Exam  Last Recorded Vitals  Blood pressure 125/78, pulse 104, temperature 36.7 °C (98.1 °F), resp. rate 18, height 1.905 m (6' 3\"), weight 104 kg (230 lb), SpO2 95 %.    The patient is a well developed, [morbidly obese male] in no acute distress.    The patient's funduscopic examination shows no papilledema bilaterally.    The patient's extremity examination shows that the pulses are 2+ in the upper and lower extremities bilaterally and there changes consistent with venous insufficiency in the right greater than left lower extremity.      The patient's mental status testing is alert and oriented ×3 with no evidence of aphasia or dysarthria.  The patient's memory testing, fund of knowledge and concentration are all within normal limits.  The patient's cranial nerves 2, 3, 4, 5, 6, 7, 8, 9, 10, 11 and 12 are all within normal limits.  The patient's motor testing shows normal tone, bulk, and power in the upper and lower extremities bilaterally.  The patient's sensory testing is intact to light touch in the upper and lower extremities bilaterally.  The patient's cerebellar testing " is intact in the upper and lower extremities bilaterally.  The patient's station and gait were not tested as the patient feels weak and that he is a high fall risk.  The patient's reflexes are 1+ in the upper and lower extremities and symmetrical.  Relevant Results        [place] Coma Scale  Best Eye Response: Spontaneous  Best Verbal Response: Oriented  Best Motor Response: Follows commands  Mateo Coma Scale Score: 15                 I have personally reviewed the following imaging results CT brain attack head wo IV contrast    Result Date: 2/25/2024  Interpreted By:  Cleo Benjamin, STUDY: CT BRAIN ATTACK HEAD WO IV CONTRAST;  2/25/2024 12:13 am   INDICATION: Signs/Symptoms:stroke alert.   COMPARISON: None.   ACCESSION NUMBER(S): [id number]   ORDERING CLINICIAN: NASIM GRUBBS   TECHNIQUE: Noncontrast CT axial images were obtained through the brain.  Coronal and sagittal reformats were performed.   FINDINGS: The ventricles, sulci and basal cisterns are within normal limits. The grey-white matter differentiation is intact. No acute territorial infarct.  There is no mass effect or midline shift. There is no extraaxial fluid collection.  There is no intracranial hemorrhage. No depressed calvarial fracture. No air-fluid levels at the visualized paranasal sinuses. The mastoid air cells are clear.       1.  No acute intracranial hemorrhage or acute territorial infarct.         MACRO: Cleo Benjamin discussed the significance and urgency of this critical finding by telephone with  Dr. Hobbs on 2/25/2024 at 12:27 am.  (**-RCF-**) Findings:  See findings.   Signed by: Cleo Benjamin 2/25/2024 12:28 AM Dictation workstation:   [id number]       Assessment/Plan     Impression: The patient is a 49-year-old male who admitted after syncopal episodes and diagnosed with a GI hemorrhage.  The patient had brief episode where he had difficulty with speech and now he is back to his baseline.  His neurological  examination is essentially normal.  The differential diagnosis for his TIA includes intra or extracranial atherosclerotic disease, small vessel stroke or a cardiac source of embolism.    Plan: The patient needs an MRI of the brain without contrast as well as an MRA of the neck and brain.  The patient needs an echocardiogram.  The patient has an elevated LDL and needs to be on atorvastatin which has been started.  The patient will need a Zio patch for 2 weeks.  I will hold on any antiplatelet therapy for now until his GI workup has been completed.  The patient needs to continue stroke risk factor modification.   The patient needs a PT, OT, social service, rehab and speech therapy consult.  The patient needs DVT prophylaxis.  The patient needs neurochecks as per protocol.  I will send the note to Dr. Hobbs.  Thank you very much for sending me this very interesting consultation.  I discussed all these issues in detail with the patient and answered all their questions.  I will continue to follow the patient while they are in the hospital.  The patient needs follow-up with their primary care doctor within 2 weeks of discharge.  On discharge, the patient will follow up with me in the office in 4 months.      Michael Gorman MD

## 2024-02-25 NOTE — CARE PLAN
The patient's goals for the shift include  less bloody stools.    The clinical goals for the shift include decreased diarrhea.    Over the shift, the patient did not make progress toward the following goals. Barriers to progression include syncopal episodes bloody stool.  Recommendations to address these barriers include medications GI consult.

## 2024-02-25 NOTE — PROGRESS NOTES
Physical Therapy                 Therapy Communication Note    Patient Name: Carson Art Jr.  MRN: [id number]  Today's Date: 2/25/2024     Discipline: Physical Therapy    Missed Visit Reason: Missed Visit Reason: Patient placed on medical hold (Pt pending MRI /c stroke alert called this AM. Will hold eval at this time. Nursing aware.)    Missed Time: Attempt

## 2024-02-26 ENCOUNTER — APPOINTMENT (OUTPATIENT)
Dept: GASTROENTEROLOGY | Facility: HOSPITAL | Age: 49
DRG: 381 | End: 2024-02-26
Payer: COMMERCIAL

## 2024-02-26 ENCOUNTER — APPOINTMENT (OUTPATIENT)
Dept: RADIOLOGY | Facility: HOSPITAL | Age: 49
DRG: 381 | End: 2024-02-26
Payer: COMMERCIAL

## 2024-02-26 ENCOUNTER — ANESTHESIA (OUTPATIENT)
Dept: GASTROENTEROLOGY | Facility: HOSPITAL | Age: 49
DRG: 381 | End: 2024-02-26
Payer: COMMERCIAL

## 2024-02-26 ENCOUNTER — ANESTHESIA EVENT (OUTPATIENT)
Dept: GASTROENTEROLOGY | Facility: HOSPITAL | Age: 49
DRG: 381 | End: 2024-02-26
Payer: COMMERCIAL

## 2024-02-26 ENCOUNTER — APPOINTMENT (OUTPATIENT)
Dept: CARDIOLOGY | Facility: HOSPITAL | Age: 49
DRG: 381 | End: 2024-02-26
Payer: COMMERCIAL

## 2024-02-26 LAB
ERYTHROCYTE [DISTWIDTH] IN BLOOD BY AUTOMATED COUNT: 12.7 % (ref 11.5–14.5)
GLUCOSE BLD MANUAL STRIP-MCNC: 56 MG/DL (ref 74–99)
GLUCOSE BLD MANUAL STRIP-MCNC: 79 MG/DL (ref 74–99)
GLUCOSE BLD MANUAL STRIP-MCNC: 86 MG/DL (ref 74–99)
GLUCOSE BLD MANUAL STRIP-MCNC: 87 MG/DL (ref 74–99)
GLUCOSE BLD MANUAL STRIP-MCNC: 88 MG/DL (ref 74–99)
HCT VFR BLD AUTO: 23.2 % (ref 41–52)
HGB BLD-MCNC: 7.5 G/DL (ref 13.5–17.5)
HOLD SPECIMEN: NORMAL
MCH RBC QN AUTO: 31.5 PG (ref 26–34)
MCHC RBC AUTO-ENTMCNC: 32.3 G/DL (ref 32–36)
MCV RBC AUTO: 98 FL (ref 80–100)
NRBC BLD-RTO: 0 /100 WBCS (ref 0–0)
PLATELET # BLD AUTO: 220 X10*3/UL (ref 150–450)
RBC # BLD AUTO: 2.38 X10*6/UL (ref 4.5–5.9)
WBC # BLD AUTO: 9.4 X10*3/UL (ref 4.4–11.3)

## 2024-02-26 PROCEDURE — 97165 OT EVAL LOW COMPLEX 30 MIN: CPT | Mod: GO

## 2024-02-26 PROCEDURE — 70544 MR ANGIOGRAPHY HEAD W/O DYE: CPT | Performed by: RADIOLOGY

## 2024-02-26 PROCEDURE — 36415 COLL VENOUS BLD VENIPUNCTURE: CPT | Performed by: INTERNAL MEDICINE

## 2024-02-26 PROCEDURE — 85027 COMPLETE CBC AUTOMATED: CPT | Performed by: INTERNAL MEDICINE

## 2024-02-26 PROCEDURE — 70551 MRI BRAIN STEM W/O DYE: CPT | Performed by: RADIOLOGY

## 2024-02-26 PROCEDURE — 70547 MR ANGIOGRAPHY NECK W/O DYE: CPT | Mod: 59

## 2024-02-26 PROCEDURE — 82947 ASSAY GLUCOSE BLOOD QUANT: CPT

## 2024-02-26 PROCEDURE — 3700000002 HC GENERAL ANESTHESIA TIME - EACH INCREMENTAL 1 MINUTE

## 2024-02-26 PROCEDURE — 0753T DGTZ GLS MCRSCP SLD LEVEL IV: CPT | Mod: TC,PARLAB | Performed by: INTERNAL MEDICINE

## 2024-02-26 PROCEDURE — 88305 TISSUE EXAM BY PATHOLOGIST: CPT | Performed by: PATHOLOGY

## 2024-02-26 PROCEDURE — 7100000002 HC RECOVERY ROOM TIME - EACH INCREMENTAL 1 MINUTE

## 2024-02-26 PROCEDURE — 2500000004 HC RX 250 GENERAL PHARMACY W/ HCPCS (ALT 636 FOR OP/ED): Performed by: INTERNAL MEDICINE

## 2024-02-26 PROCEDURE — 70547 MR ANGIOGRAPHY NECK W/O DYE: CPT | Performed by: RADIOLOGY

## 2024-02-26 PROCEDURE — A43239 PR EDG TRANSORAL BIOPSY SINGLE/MULTIPLE: Performed by: ANESTHESIOLOGY

## 2024-02-26 PROCEDURE — 70544 MR ANGIOGRAPHY HEAD W/O DYE: CPT | Mod: 59

## 2024-02-26 PROCEDURE — 2500000005 HC RX 250 GENERAL PHARMACY W/O HCPCS: Performed by: ANESTHESIOLOGIST ASSISTANT

## 2024-02-26 PROCEDURE — 2500000004 HC RX 250 GENERAL PHARMACY W/ HCPCS (ALT 636 FOR OP/ED): Performed by: ANESTHESIOLOGIST ASSISTANT

## 2024-02-26 PROCEDURE — 94760 N-INVAS EAR/PLS OXIMETRY 1: CPT

## 2024-02-26 PROCEDURE — A43239 PR EDG TRANSORAL BIOPSY SINGLE/MULTIPLE: Performed by: ANESTHESIOLOGIST ASSISTANT

## 2024-02-26 PROCEDURE — 0DB68ZX EXCISION OF STOMACH, VIA NATURAL OR ARTIFICIAL OPENING ENDOSCOPIC, DIAGNOSTIC: ICD-10-PCS | Performed by: INTERNAL MEDICINE

## 2024-02-26 PROCEDURE — 99140 ANES COMP EMERGENCY COND: CPT | Performed by: ANESTHESIOLOGY

## 2024-02-26 PROCEDURE — 97161 PT EVAL LOW COMPLEX 20 MIN: CPT | Mod: GP

## 2024-02-26 PROCEDURE — 3700000001 HC GENERAL ANESTHESIA TIME - INITIAL BASE CHARGE

## 2024-02-26 PROCEDURE — 1200000002 HC GENERAL ROOM WITH TELEMETRY DAILY

## 2024-02-26 PROCEDURE — 99233 SBSQ HOSP IP/OBS HIGH 50: CPT | Performed by: INTERNAL MEDICINE

## 2024-02-26 PROCEDURE — 70551 MRI BRAIN STEM W/O DYE: CPT

## 2024-02-26 PROCEDURE — 7100000001 HC RECOVERY ROOM TIME - INITIAL BASE CHARGE

## 2024-02-26 PROCEDURE — 43239 EGD BIOPSY SINGLE/MULTIPLE: CPT | Performed by: INTERNAL MEDICINE

## 2024-02-26 PROCEDURE — 99232 SBSQ HOSP IP/OBS MODERATE 35: CPT | Performed by: NURSE PRACTITIONER

## 2024-02-26 PROCEDURE — C9113 INJ PANTOPRAZOLE SODIUM, VIA: HCPCS | Performed by: INTERNAL MEDICINE

## 2024-02-26 PROCEDURE — 0DB98ZX EXCISION OF DUODENUM, VIA NATURAL OR ARTIFICIAL OPENING ENDOSCOPIC, DIAGNOSTIC: ICD-10-PCS | Performed by: INTERNAL MEDICINE

## 2024-02-26 RX ORDER — PROPOFOL 10 MG/ML
INJECTION, EMULSION INTRAVENOUS AS NEEDED
Status: DISCONTINUED | OUTPATIENT
Start: 2024-02-26 | End: 2024-02-26

## 2024-02-26 RX ORDER — LIDOCAINE HCL/PF 100 MG/5ML
SYRINGE (ML) INTRAVENOUS AS NEEDED
Status: DISCONTINUED | OUTPATIENT
Start: 2024-02-26 | End: 2024-02-26

## 2024-02-26 RX ADMIN — PANTOPRAZOLE SODIUM 40 MG: 40 INJECTION, POWDER, FOR SOLUTION INTRAVENOUS at 09:33

## 2024-02-26 RX ADMIN — PROPOFOL 150 MG: 10 INJECTION, EMULSION INTRAVENOUS at 14:37

## 2024-02-26 RX ADMIN — PANTOPRAZOLE SODIUM 40 MG: 40 INJECTION, POWDER, FOR SOLUTION INTRAVENOUS at 19:52

## 2024-02-26 RX ADMIN — PROPOFOL 30 MG: 10 INJECTION, EMULSION INTRAVENOUS at 14:45

## 2024-02-26 RX ADMIN — PROPOFOL 50 MG: 10 INJECTION, EMULSION INTRAVENOUS at 14:40

## 2024-02-26 RX ADMIN — LIDOCAINE HYDROCHLORIDE 100 MG: 20 INJECTION, SOLUTION INTRAVENOUS at 14:37

## 2024-02-26 SDOH — HEALTH STABILITY: MENTAL HEALTH: CURRENT SMOKER: 0

## 2024-02-26 ASSESSMENT — COGNITIVE AND FUNCTIONAL STATUS - GENERAL
PERSONAL GROOMING: A LITTLE
EATING MEALS: A LITTLE
DRESSING REGULAR UPPER BODY CLOTHING: A LITTLE
DRESSING REGULAR LOWER BODY CLOTHING: A LITTLE
DAILY ACTIVITIY SCORE: 22
DRESSING REGULAR LOWER BODY CLOTHING: A LITTLE
MOBILITY SCORE: 17
MOVING TO AND FROM BED TO CHAIR: A LITTLE
CLIMB 3 TO 5 STEPS WITH RAILING: A LOT
STANDING UP FROM CHAIR USING ARMS: A LITTLE
HELP NEEDED FOR BATHING: A LITTLE
CLIMB 3 TO 5 STEPS WITH RAILING: A LITTLE
TURNING FROM BACK TO SIDE WHILE IN FLAT BAD: A LITTLE
HELP NEEDED FOR BATHING: A LITTLE
DAILY ACTIVITIY SCORE: 18
MOBILITY SCORE: 21
CLIMB 3 TO 5 STEPS WITH RAILING: A LITTLE
MOBILITY SCORE: 21
MOVING FROM LYING ON BACK TO SITTING ON SIDE OF FLAT BED WITH BEDRAILS: A LITTLE
WALKING IN HOSPITAL ROOM: A LITTLE
TOILETING: A LITTLE
MOVING TO AND FROM BED TO CHAIR: A LITTLE
WALKING IN HOSPITAL ROOM: A LITTLE
DAILY ACTIVITIY SCORE: 24
WALKING IN HOSPITAL ROOM: A LITTLE
MOVING TO AND FROM BED TO CHAIR: A LITTLE

## 2024-02-26 ASSESSMENT — PAIN - FUNCTIONAL ASSESSMENT
PAIN_FUNCTIONAL_ASSESSMENT: 0-10

## 2024-02-26 ASSESSMENT — PAIN SCALES - GENERAL
PAINLEVEL_OUTOF10: 0 - NO PAIN
PAIN_LEVEL: 0
PAINLEVEL_OUTOF10: 0 - NO PAIN

## 2024-02-26 NOTE — ANESTHESIA POSTPROCEDURE EVALUATION
Patient: Carson Art Jr.    Procedure Summary       Date: 02/26/24 Room / Location: [facility]    Anesthesia Start: 1433 Anesthesia Stop: 1454    Procedure: EGD Diagnosis: Gastrointestinal hemorrhage with melena    Scheduled Providers: Ilan Bell MD; Catarino Lauren MD Responsible Provider: Catarino Lauren MD    Anesthesia Type: MAC ASA Status: 3 - Emergent            Anesthesia Type: MAC    Vitals Value Taken Time   /52 02/26/24 1453   Temp 36.3 °C (97.3 °F) 02/26/24 1453   Pulse 92 02/26/24 1453   Resp 16 02/26/24 1453   SpO2 97 % 02/26/24 1453       Anesthesia Post Evaluation    Patient location during evaluation: PACU  Patient participation: complete - patient participated  Level of consciousness: awake and alert  Pain score: 0  Pain management: adequate  Airway patency: patent  Cardiovascular status: acceptable and hemodynamically stable  Respiratory status: acceptable, spontaneous ventilation and nasal cannula  Hydration status: acceptable  Postoperative Nausea and Vomiting: none        There were no known notable events for this encounter.

## 2024-02-26 NOTE — ANESTHESIA PREPROCEDURE EVALUATION
Patient: Carson Art Jr.    Procedure Information       Date/Time: 02/26/24 1430    Scheduled providers: Ilan Bell MD; Catarino Lauren MD    Procedure: EGD    Location: [facility]            Relevant Problems   Anesthesia (within normal limits)      GI   (+) GI bleed   (+) Gastrointestinal hemorrhage, unspecified gastrointestinal hemorrhage type       Clinical information reviewed:    Allergies  Meds               NPO Detail:  NPO/Void Status  Date of Last Liquid: 02/25/24  Time of Last Liquid: 2100  Date of Last Solid: 02/23/24  Time of Last Solid: 1000         Physical Exam    Airway  Mallampati: III  TM distance: >3 FB  Neck ROM: full     Cardiovascular - normal exam     Dental - normal exam       Pulmonary - normal exam     Abdominal - normal exam             Anesthesia Plan    History of general anesthesia?: yes  History of complications of general anesthesia?: no    ASA 3 - emergent     MAC     The patient is not a current smoker.    intravenous induction   Anesthetic plan and risks discussed with patient.    Plan discussed with CAA.

## 2024-02-26 NOTE — PROGRESS NOTES
TCC Note: Attempted to meet with pt at bedside to complete Discharge Assessment however, pt is off the floor for EGD. Will attempt again when pt returns. Sandra Pantoja, MSN, RN, TCC.

## 2024-02-26 NOTE — PROGRESS NOTES
"Hospital Medicine Daily Progress Note    Patient Name: Carson Art Jr.   YOB: 1975      Subjective:  Carson Art Jr. is a 49 y.o.yo male who is hospital day 2     Patient with EGD this afternoon.  Found with esophageal and gastric ulcer.  Hemoglobin down to 7.5 this morning.  MRI is negative    Objective:  Recent labs, imaging, vital signs and notes from other providers were reviewed     /63   Pulse 90   Temp 36.3 °C (97.3 °F) (Temporal)   Resp 18   Ht 1.905 m (6' 3\")   Wt 104 kg (230 lb)   SpO2 97%   BMI 28.75 kg/m²     Physical Exam:     GENERAL: Well developed and in no apparent distress. Alert and cooperative.  HEENT: Normocephalic and atraumatic  CARDIOVASCULAR: Tachycardic  RESPIRATORY: Clear to auscultation b/l. No wheezes, rales or rhonchi. Normal effort.   ABDOMEN: Soft, non-tender. Not distended. No rebound or guarding.  EXTREMITIES: Venous stasis skin changes  NEUROLOGICAL: A&O X 3. No focal deficits.   SKIN: Warm and dry, no lesions, no rashes.  PSYCH: Appropriate mood and affect.        Assessment/Plan:     Patient is a 49-year-old male without any significant medical history who presents with multiple syncopal episodes in the setting of stomach upset and black-colored stools.  His labs showed decreased hemoglobin as well as increased BUN with a normal creatinine.  He was tachycardic. Recently was on a course of NSAIDs.     Upper GI bleed  TIA?  Syncopal episode  Anemia  Lactic acidosis  Non-MI troponin elevation  Obesity     Continue with IV Protonix 40 mg daily to complete 72 hours, followed by 40 mg twice daily p.o. thereafter.  Resume regular diet.  IV Venofer tonight.  CBC in the morning  MRIs are negative, I suspect his symptomology is related to the bleeding and anemia.  Discontinue statin and defer on aspirin  Believe his troponin elevation is secondary to the above issues, no chest pain or concerning EKG findings. Down trended.            DVT Prophylaxis: " Pt BIBA from dog vets office after dog passed away and pt had panic attack. Pt states she now feels better and is just upset. SCDs only  Code Status: Full code  Disposition: Anticipate discharge home tomorrow      Arash Diallo, DO  Hospital Medicine

## 2024-02-26 NOTE — DISCHARGE INSTRUCTIONS
-You will wear a 14-day continuous cardiac event monitor at discharge to rule evidence of cardiac arrhythmias that could have provoked syncope

## 2024-02-26 NOTE — PROGRESS NOTES
"Carson Art Jr. is a 49 y.o. male on day 1 of admission presenting with GI bleed.      Subjective   MRI of the brain without contrast is negative for any acute findings in relation to CVA or hemorrhage.  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  Patient to follow-up with neurosurgery for these findings in the outpatient setting.  MRI of the head and neck does note moderate stenosis to the A2 aspect of the right posterior MONIQUE.  Echocardiogram pending completion; if unremarkable, neurology to sign off on care.    Patient scheduled to undergo EGD today given active GI bleeding.  All antiplatelet/anticoagulant medications are currently being held due to this finding.       Objective     Last Recorded Vitals  Blood pressure 118/60, pulse 91, temperature 36.3 °C (97.3 °F), resp. rate 22, height 1.905 m (6' 3\"), weight 104 kg (230 lb), SpO2 98 %.  Physical exam/neurological exam  Patient seen and examined at this time; upon entering room he is resting quietly in bed. Appears fully developed and well nourished.   Mental status: A&Ox 3. Memory testing, fund of knowledge and concentration within normal limits. Speech is fluent and negative for any paraphrasic errors.     Cranial Nerves:  Optic II/ Oculomotor III: Fundoscopic exam was technically difficult. PERRL +2. Visual fields are full. Convergence and accomodation noted without difficulty. Negative for deficits to visual acuity confrontation via static-finger wiggle test. Eyes appear aligned and free of exophthalmos and ptosis. Sclera are white bilaterally and lens are free from clouding.   Oculomotor III/ Trochlear IV/ Abducens VI: Extraocular movements are full, with no evidence of nystagmus. Negative for diplopia.   Trigeminal V: Facial sensation is intact to light touch. Corneal reflex responsive when threatened bilaterally.  Facial VII: intact; nose is midline, with no evidence of flattening to nasolabial folds noted and mouth is " negative for evidence of droop. Patient is successfully able to follow commands to raise eyebrows, squeeze eyes shut, smile and show teeth, frown, and puff out cheeks.   Acoustic VIII: Hearing is intact bilaterally.  Glossopharngyeal IX/ Vagus X: Palate elevates symmetrically to phonation. Findings are negative for uvula deviation or dysphagia.   Spinal accessory XI: Sternocleidomastoid/ upper trapezius is 5/5 to strength testing. No asymmetry noted to strength, bulk, or tone.   Hypoglossal XII: Tongue is midline and without deviations. Phonation is articulate and is negative for findings of dysarthria or aphasia.     Motor exam: negative for evidence of involuntary movements or fasiculations. BUE flexion of biceps and brachioradialis graded 5/5, in addition to extension of triceps at elbow and wrists. BUE  strength 5/5, along with finger abduction and thumb opposition. BLE hip flexion, extension, adduction, and abduction 5/5. Knee extension & flexion 5/5. Ankle dorsiflexion and plantarflexion 5/5. Normal bulk and normal tone.     Sensory exam: Sensation is intact to light touch throughout.    Reflexes: Reflexes are 2+ and symmetric. Bilateral plantar responses are flexor. Ankle jerks symmetric.     Coordination: finger-to-nose testing is negative for signs of dysmetria. Pronator drift testing to BUE negative. Rapid alternating hand movements WNL.    Gait exam: negative for ataxia.    Relevant Results  Scheduled medications  [Held by provider] aspirin, 81 mg, oral, Daily  atorvastatin, 80 mg, oral, Nightly  pantoprazole, 40 mg, intravenous, BID      Continuous medications  sodium chloride 0.9%, 125 mL/hr, Last Rate: 125 mL/hr (02/25/24 0030)      PRN medications  PRN medications: acetaminophen, hydrALAZINE **FOLLOWED BY** [START ON 2/27/2024] hydrALAZINE, labetaloL, ondansetron, oxygen, polyethylene glycol  MR brain wo IV contrast    Result Date: 2/26/2024  Interpreted By:  Sebastián Shahid, STUDY: MR BRAIN WO IV  CONTRAST; MR ANGIO HEAD WO IV CONTRAST; MR ANGIO NECK WO IV CONTRAST   INDICATION: Signs/Symptoms:stroke, expressive aphasia; Signs/Symptoms:expressive aphasia   COMPARISON:   Head CT performed 02/25/2024   ACCESSION NUMBER(S): [id number]; [id number]; [id number]   ORDERING CLINICIAN: HUBERT RHOADES   TECHNIQUE: Multi-planar multi-sequential MR imaging of the brain was performed without intravenous contrast. MRA of the head using time-of-flight technique was performed without intravenous contrast. MRA of the neck using time-of-flight technique was performed without intravenous contrast.   FINDINGS: MRI BRAIN:   No acute infarction, intracranial hemorrhage or mass lesion.   No hydrocephalus.  No extra-axial fluid collections. The skull base flow voids are present.   The visualized intraorbital contents are normal. The imaged portions of the paranasal sinuses are clear. The mastoid air cells are clear. The visualized osseous structures, soft tissues and partially visualized parotid glands appear normal. Rounded FLAIR hyperintense lesion within the left parasagittal aspect of the clivus (series 8, image 7) likely represents hemangioma.   MRA NECK:   Standard configuration of the aortic arch with no gross ostial stenosis of the great vessels.   The common carotid arteries are patent bilaterally without evidence of stenosis. There is no narrowing of the cervical internal carotid arteries bilaterally.   The vertebral arteries are patent bilaterally throughout their course.   MRA HEAD:   Normal distal internal carotid arteries.   Moderate focal stenosis of the proximal right A2 segment (series 5, image 29); there is immediate reconstitution. Remainder of the visualized right MONIQUE is normal. Left MONIQUE as well as the bilateral MCAs and PCAs are normal. Fetal origin of the left PCA.   Normal vertebrobasilar system.   No evidence of vascular stenosis, occlusion, aneurysm or vascular malformation.       No acute  intracranial abnormality.   Moderate focal stenosis of the proximal right A2 segment of the MONIQUE with immediate reconstitution. Otherwise, cervical and intracranial vasculature is within normal limits.   _____________ NASCET criteria for internal carotid artery stenosis: Mild: 0% to 49% Moderate: 50% to 69% Severe: 70% to 99% Complete Occlusion   Signed by: Sebastián Shahid 2/26/2024 8:56 AM Dictation workstation:   [id number]    MR angio neck wo IV contrast    Result Date: 2/26/2024  Interpreted By:  Sebastián Shahid, STUDY: MR BRAIN WO IV CONTRAST; MR ANGIO HEAD WO IV CONTRAST; MR ANGIO NECK WO IV CONTRAST   INDICATION: Signs/Symptoms:stroke, expressive aphasia; Signs/Symptoms:expressive aphasia   COMPARISON:   Head CT performed 02/25/2024   ACCESSION NUMBER(S): [id number]; [id number]; [id number]   ORDERING CLINICIAN: HUBERT RHOADES   TECHNIQUE: Multi-planar multi-sequential MR imaging of the brain was performed without intravenous contrast. MRA of the head using time-of-flight technique was performed without intravenous contrast. MRA of the neck using time-of-flight technique was performed without intravenous contrast.   FINDINGS: MRI BRAIN:   No acute infarction, intracranial hemorrhage or mass lesion.   No hydrocephalus.  No extra-axial fluid collections. The skull base flow voids are present.   The visualized intraorbital contents are normal. The imaged portions of the paranasal sinuses are clear. The mastoid air cells are clear. The visualized osseous structures, soft tissues and partially visualized parotid glands appear normal. Rounded FLAIR hyperintense lesion within the left parasagittal aspect of the clivus (series 8, image 7) likely represents hemangioma.   MRA NECK:   Standard configuration of the aortic arch with no gross ostial stenosis of the great vessels.   The common carotid arteries are patent bilaterally without evidence of stenosis. There is no narrowing of the cervical internal carotid  arteries bilaterally.   The vertebral arteries are patent bilaterally throughout their course.   MRA HEAD:   Normal distal internal carotid arteries.   Moderate focal stenosis of the proximal right A2 segment (series 5, image 29); there is immediate reconstitution. Remainder of the visualized right MONIQUE is normal. Left MONIQUE as well as the bilateral MCAs and PCAs are normal. Fetal origin of the left PCA.   Normal vertebrobasilar system.   No evidence of vascular stenosis, occlusion, aneurysm or vascular malformation.       No acute intracranial abnormality.   Moderate focal stenosis of the proximal right A2 segment of the MONIQUE with immediate reconstitution. Otherwise, cervical and intracranial vasculature is within normal limits.   _____________ NASCET criteria for internal carotid artery stenosis: Mild: 0% to 49% Moderate: 50% to 69% Severe: 70% to 99% Complete Occlusion   Signed by: Sebastián Shahid 2/26/2024 8:56 AM Dictation workstation:   [id number]    MR angio head wo IV contrast    Result Date: 2/26/2024  Interpreted By:  Sebastián Shahid, STUDY: MR BRAIN WO IV CONTRAST; MR ANGIO HEAD WO IV CONTRAST; MR ANGIO NECK WO IV CONTRAST   INDICATION: Signs/Symptoms:stroke, expressive aphasia; Signs/Symptoms:expressive aphasia   COMPARISON:   Head CT performed 02/25/2024   ACCESSION NUMBER(S): [id number]; [id number]; [id number]   ORDERING CLINICIAN: HUBERT RHOADES   TECHNIQUE: Multi-planar multi-sequential MR imaging of the brain was performed without intravenous contrast. MRA of the head using time-of-flight technique was performed without intravenous contrast. MRA of the neck using time-of-flight technique was performed without intravenous contrast.   FINDINGS: MRI BRAIN:   No acute infarction, intracranial hemorrhage or mass lesion.   No hydrocephalus.  No extra-axial fluid collections. The skull base flow voids are present.   The visualized intraorbital contents are normal. The imaged portions of the paranasal  sinuses are clear. The mastoid air cells are clear. The visualized osseous structures, soft tissues and partially visualized parotid glands appear normal. Rounded FLAIR hyperintense lesion within the left parasagittal aspect of the clivus (series 8, image 7) likely represents hemangioma.   MRA NECK:   Standard configuration of the aortic arch with no gross ostial stenosis of the great vessels.   The common carotid arteries are patent bilaterally without evidence of stenosis. There is no narrowing of the cervical internal carotid arteries bilaterally.   The vertebral arteries are patent bilaterally throughout their course.   MRA HEAD:   Normal distal internal carotid arteries.   Moderate focal stenosis of the proximal right A2 segment (series 5, image 29); there is immediate reconstitution. Remainder of the visualized right MONIQUE is normal. Left MONIQUE as well as the bilateral MCAs and PCAs are normal. Fetal origin of the left PCA.   Normal vertebrobasilar system.   No evidence of vascular stenosis, occlusion, aneurysm or vascular malformation.       No acute intracranial abnormality.   Moderate focal stenosis of the proximal right A2 segment of the MONIQUE with immediate reconstitution. Otherwise, cervical and intracranial vasculature is within normal limits.   _____________ NASCET criteria for internal carotid artery stenosis: Mild: 0% to 49% Moderate: 50% to 69% Severe: 70% to 99% Complete Occlusion   Signed by: Sebastián Shahid 2/26/2024 8:56 AM Dictation workstation:   [id number]    CT brain attack head wo IV contrast    Result Date: 2/25/2024  Interpreted By:  Cleo Benjamin, STUDY: CT BRAIN ATTACK HEAD WO IV CONTRAST;  2/25/2024 12:13 am   INDICATION: Signs/Symptoms:stroke alert.   COMPARISON: None.   ACCESSION NUMBER(S): [id number]   ORDERING CLINICIAN: NASIM GRUBBS   TECHNIQUE: Noncontrast CT axial images were obtained through the brain.  Coronal and sagittal reformats were performed.   FINDINGS: The  ventricles, sulci and basal cisterns are within normal limits. The grey-white matter differentiation is intact. No acute territorial infarct.  There is no mass effect or midline shift. There is no extraaxial fluid collection.  There is no intracranial hemorrhage. No depressed calvarial fracture. No air-fluid levels at the visualized paranasal sinuses. The mastoid air cells are clear.       1.  No acute intracranial hemorrhage or acute territorial infarct.         MACRO: Cleo Benjamin discussed the significance and urgency of this critical finding by telephone with  Dr. Hobbs on 2/25/2024 at 12:27 am.  (**-RCF-**) Findings:  See findings.   Signed by: Cleo Benjamin 2/25/2024 12:28 AM Dictation workstation:   [id number]   Results for orders placed or performed during the hospital encounter of 02/24/24 (from the past 24 hour(s))   POCT GLUCOSE   Result Value Ref Range    POCT Glucose 109 (H) 74 - 99 mg/dL   POCT GLUCOSE   Result Value Ref Range    POCT Glucose 101 (H) 74 - 99 mg/dL   Hemoglobin and hematocrit, blood   Result Value Ref Range    Hemoglobin 7.8 (L) 13.5 - 17.5 g/dL    Hematocrit 23.6 (L) 41.0 - 52.0 %   POCT GLUCOSE   Result Value Ref Range    POCT Glucose 79 74 - 99 mg/dL   CBC   Result Value Ref Range    WBC 9.4 4.4 - 11.3 x10*3/uL    nRBC 0.0 0.0 - 0.0 /100 WBCs    RBC 2.38 (L) 4.50 - 5.90 x10*6/uL    Hemoglobin 7.5 (L) 13.5 - 17.5 g/dL    Hematocrit 23.2 (L) 41.0 - 52.0 %    MCV 98 80 - 100 fL    MCH 31.5 26.0 - 34.0 pg    MCHC 32.3 32.0 - 36.0 g/dL    RDW 12.7 11.5 - 14.5 %    Platelets 220 150 - 450 x10*3/uL   SST TOP   Result Value Ref Range    Extra Tube Hold for add-ons.    POCT GLUCOSE   Result Value Ref Range    POCT Glucose 56 (L) 74 - 99 mg/dL                         [place] Coma Scale  Best Eye Response: Spontaneous  Best Verbal Response: Oriented  Best Motor Response: Follows commands  Mateo Coma Scale Score: 15                             Assessment/Plan      Principal  Problem:    GI bleed  Active Problems:    Gastrointestinal hemorrhage, unspecified gastrointestinal hemorrhage type  Syncopal episode  -Patient to continue holding all antiplatelet/anticoagulant medications at this time given active GI bleeding.  Will defer reinitiation of this medication to GI team once diagnostic testing is complete.  Patient is scheduled for EGD later today.  -Continue atorvastatin 80 mg p.o. daily for additional CVA prophylaxis  -Echocardiogram with bubble study pending completion; if unremarkable, neurology to sign off on care.  -Recommendations for needs during hospitalization and at discharge via PT and OT  -Continue promotion of lifestyle modifications, such as: Strict BP and glycemic control, dietary habit changes, incorporation of daily exercise regimen, adherence to all prescription/OTC medication schedules, attendance to all follow-up appointments, cessation from smoking if applicable, abstinence from alcohol and illicit drug use if applicable  -Patient to follow-up with PCP in 1 to 2 weeks postdischarge  -Patient to follow-up with neurosurgery in the outpatient setting in order to 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  If echocardiogram is abnormal, then patient will require a cardiology consult.  It is recommended for patient to wear 14-day cardiac event monitor at discharge to rule out evidence of cardiac arrhythmias that could have provoked syncopal episodes.    Total face-to-face time spent with patient today was approximately 30 minutes; more than 50% of my time was spent counseling patient on: preparation to see patient via chart review of resulted laboratory values, radiographic imaging, prescribed medications, and impairment of involved organ systems. Time also spent on medical examination,  placing appropriate orders for testing/medications, communicating with other health care providers, counseling/educating the patient/family, and care coordination.    *This note was created using voice recognition transcription software. Despite proofreading, unintentional typographical errors may be present. Please contact the department of neurology with any questions or concerns.         Zoya Wolf, ELISA-CNP

## 2024-02-26 NOTE — PROGRESS NOTES
Occupational Therapy    Evaluation    Patient Name: Carson Art Jr.  MRN: [id number]  Today's Date: 2/26/2024  Time Calculation  Start Time: 1123  Stop Time: 1131  Time Calculation (min): 8 min        Assessment:     Strengths: Capable of completing ADLs semi/independent, Living arrangement secure  Plan:  No Skilled OT: At baseline function  OT Frequency: OT eval only  OT - OK to Discharge: Yes       Subjective   Current Problem:  1. Gastrointestinal hemorrhage, unspecified gastrointestinal hemorrhage type        2. TIA (transient ischemic attack)  Transthoracic Echo (TTE) Complete    Transthoracic Echo (TTE) Complete      3. Gastrointestinal hemorrhage with melena  EGD    EGD      4. Syncope, unspecified syncope type  Holter Or Event Cardiac Monitor    Holter Or Event Cardiac Monitor        General:  General  Reason for Referral: OT eval and tx; ADLs. dx; 1. Upper GI bleed  2. Syncopal episode  3. Anemia  4. Lactic acidosis  5. Non-MI troponin elevation  6. Obesity  Referred By: Yoel  Past Medical History Relevant to Rehab: 49 y.o. male who presents to the hospital with complaints of multiple syncopal episodes in the setting of melena.  Patient states overnight he was feeling an upset stomach.  He had several bowel movements.  This morning he was returning to bed after a bowel movement when he had a syncopal episode.  He noticed his stools were black and tarry.  Squad was called.  He apparently had 1-2 other syncopal episodes thereafter.  In the emergency department patient was found to have relatively stable hemodynamics, he was tachycardic.  His hemoglobin is down to 10.7 without any baseline reference.  He does endorse taking 800 mg of ibuprofen daily for about 2 weeks for dental pain, last dose was approximately 10 to 14 days ago.  He is not on any blood thinners.  No history of GERD.  Patient denies any chest pain, palpitations. (Past Medical History:  1. Venous insufficiency     Past Surgical History:  1.  Venous ablation)  Prior to Session Communication: Bedside nurse  Patient Position Received:  (patient lying in bed at start of eval and sitting EOB end of eval. alarm off upon arrival; IV and tele in place throughout)  General Comment: MR brain adn MRA head/neck in process. 2/25: stroke team called, expressive aphasia, only lasting minutes . hematemesis. gastro consult: EGD 2/26. neuro consult: needs echo.  Precautions:  Medical Precautions:  (aspiration precautions, fall precautions, NPO)    Pain:  Pain Assessment  Pain Assessment:  (did not report pain)    Objective   Cognition:  Overall Cognitive Status: Within Functional Limits           Home Living:  Type of Home:  (per patient report: currently living with his dad, as his dad recently had a stroke. ranch style home, 2 ROXY.)  Bathroom Shower/Tub:  (tub shower, grab bar, shower chair.)  Bathroom Toilet:  (high rise toilet with grab bar)  Prior Function:  Level of [place]:  (independent in ADLs, no AD use. share IADLs with dad. hired help for cleaning. drives. works as full time pharmacist at [employer]. sleeps in standard bed. denies other falls.)    ADL:  ADL Comments: anticipate SBA- MOD I for ADLs based on performance with transfers and mobility this date       Bed Mobility/Transfers: Bed Mobility  Bed Mobility:  (completed supine to sit at MOD I)    Transfers  Transfer:  (completed STS at MOD I without AD)      Ambulation/Gait Training:  Ambulation/Gait Training  Ambulation/Gait Training Performed:  (engaged in simple mobility with and without WW with supervsion for safety)     Sensation:  Light Touch: No apparent deficits  Strength:  Strength Comments: BUE ROM/MMT WFL for patient age      Outcome Measures:[facility] Daily Activity  Putting on and taking off regular lower body clothing: None  Bathing (including washing, rinsing, drying): None  Putting on and taking off regular upper body clothing: None  Toileting, which includes using toilet, bedpan or urinal:  None  Taking care of personal grooming such as brushing teeth: None  Eating Meals: None  Daily Activity - Total Score: 24        Education Documentation  Body Mechanics, taught by Erin Dickerson OT at 2/26/2024  1:45 PM.  Learner: Patient  Readiness: Acceptance  Method: Explanation  Response: Verbalizes Understanding    Education Comments  No comments found.

## 2024-02-26 NOTE — CARE PLAN
The patient's goals for the shift include  no bloody stools.    The clinical goals for the shift include patient will have decreased diarrhea during shift.    Over the shift, the patient did not make progress toward the following goals. Barriers to progression include dark tarry stool. Recommendations to address these barriers include egd GI consult.

## 2024-02-26 NOTE — PROGRESS NOTES
Physical Therapy    Physical Therapy Evaluation    Patient Name: Carson Art Jr.  MRN: [id number]  Today's Date: 2/26/2024   Time Calculation  Start Time: 1120  Stop Time: 1131  Time Calculation (min): 11 min    Assessment/Plan   PT Assessment  Rehab Prognosis: Good  Evaluation/Treatment Tolerance: Patient tolerated treatment well  End of Session Communication: Bedside nurse  End of Session Patient Position: Bed, 2 rail up (sitting EOB)  IP OR SWING BED PT PLAN  Inpatient or Swing Bed: Inpatient  PT Plan  PT Plan: PT Eval only  PT Eval Only Reason: At baseline function  PT Frequency: PT eval only  PT Discharge Recommendations: No PT needed after discharge, No further acute PT  PT - OK to Discharge: Yes (when cleared by medical team)    Subjective     Current Problem:  Patient Active Problem List   Diagnosis    GI bleed    Gastrointestinal hemorrhage, unspecified gastrointestinal hemorrhage type       General Visit Information:  General  Reason for Referral: PT eval and treat; multiple syncopal episodes in setting of melena  Referred By: Yoel  Past Medical History Relevant to Rehab: GERD, venous insufficiency  Prior to Session Communication: Bedside nurse  Patient Position Received: Bed, 2 rail up, Alarm off, not on at start of session  General Comment: Pt agreeable to PT eval    Home Living:  Home Living  Type of Home:  (per patient report: currently living with his dad, as his dad recently had a stroke. ranch style home, 2 ROXY.)  Bathroom Shower/Tub:  (tub shower, grab bar, shower chair.)  Bathroom Toilet:  (high rise toilet with grab bar)    Prior Level of Function:  Prior Function Per Pt/Caregiver Report  Level of [place]:  (independent in ADLs, no AD use. share IADLs with dad. hired help for cleaning. drives. works as full time pharmacist at [employer]. sleeps in standard bed. denies other falls.)    Precautions:  Precautions  Precautions Comment: aspiration precautions, fall precautions, NPO       Objective      Pain:  Pain Assessment  Pain Assessment: 0-10  Pain Score: 0 - No pain    Cognition:  Cognition  Overall Cognitive Status: Within Functional Limits    General Assessments:         Sensation  Light Touch: No apparent deficits  Strength  Strength Comments: BLE WFL      Functional Assessments:     Bed Mobility  Bed Mobility:  (supine<>sit IND)  Transfers  Transfer:  (sit to stand IND)  Ambulation/Gait Training  Ambulation/Gait Training Performed:  (Pt ambulates 20 ft with WW and 30-40ft without device with supervision with no acute LOB. Slow spring)    Outcome Measures:  [facility] Basic Mobility  Turning from your back to your side while in a flat bed without using bedrails: None  Moving from lying on your back to sitting on the side of a flat bed without using bedrails: None  Moving to and from bed to chair (including a wheelchair): A little  Standing up from a chair using your arms (e.g. wheelchair or bedside chair): None  To walk in hospital room: A little  Climbing 3-5 steps with railing: A little  Basic Mobility - Total Score: 21    Goals:  Encounter Problems       Encounter Problems (Active)       Safety       LTG - Patient will adhere to hip precautions during ADL's and transfers       Start:  02/25/24            LTG - Patient will demonstrate safety requirements appropriate to situation/environment       Start:  02/25/24            LTG - Patient will utilize safety techniques       Start:  02/25/24            STG - Patient locks brakes on wheelchair       Start:  02/25/24            STG - Patient uses call light consistently to request assistance with transfers       Start:  02/25/24            STG - Patient uses gait belt during all transfers       Start:  02/25/24            Goal 1       Start:  02/25/24            Goal 2       Start:  02/25/24            Goal 3       Start:  02/25/24                 Education Documentation  No documentation found.  Education Comments  No comments found.

## 2024-02-27 ENCOUNTER — APPOINTMENT (OUTPATIENT)
Dept: CARDIOLOGY | Facility: HOSPITAL | Age: 49
DRG: 381 | End: 2024-02-27
Payer: COMMERCIAL

## 2024-02-27 LAB
AORTIC VALVE MEAN GRADIENT: 6 MMHG
AORTIC VALVE PEAK VELOCITY: 1.61 M/S
AV PEAK GRADIENT: 10.4 MMHG
AVA (PEAK VEL): 3.4 CM2
AVA (VTI): 3.76 CM2
EJECTION FRACTION APICAL 4 CHAMBER: 75.7
EJECTION FRACTION: 70 %
ERYTHROCYTE [DISTWIDTH] IN BLOOD BY AUTOMATED COUNT: 12.5 % (ref 11.5–14.5)
GLUCOSE BLD MANUAL STRIP-MCNC: 106 MG/DL (ref 74–99)
GLUCOSE BLD MANUAL STRIP-MCNC: 108 MG/DL (ref 74–99)
GLUCOSE BLD MANUAL STRIP-MCNC: 160 MG/DL (ref 74–99)
GLUCOSE BLD MANUAL STRIP-MCNC: 89 MG/DL (ref 74–99)
HCT VFR BLD AUTO: 22.5 % (ref 41–52)
HGB BLD-MCNC: 7.2 G/DL (ref 13.5–17.5)
HOLD SPECIMEN: NORMAL
LEFT ATRIUM VOLUME AREA LENGTH INDEX BSA: 24.8 ML/M2
LEFT VENTRICLE INTERNAL DIMENSION DIASTOLE: 5.2 CM (ref 3.5–6)
LEFT VENTRICULAR OUTFLOW TRACT DIAMETER: 2.4 CM
MCH RBC QN AUTO: 31.2 PG (ref 26–34)
MCHC RBC AUTO-ENTMCNC: 32 G/DL (ref 32–36)
MCV RBC AUTO: 97 FL (ref 80–100)
MITRAL VALVE E/A RATIO: 0.93
NRBC BLD-RTO: 0 /100 WBCS (ref 0–0)
PLATELET # BLD AUTO: 271 X10*3/UL (ref 150–450)
RBC # BLD AUTO: 2.31 X10*6/UL (ref 4.5–5.9)
RIGHT VENTRICLE FREE WALL PEAK S': 19.4 CM/S
TRICUSPID ANNULAR PLANE SYSTOLIC EXCURSION: 3.3 CM
WBC # BLD AUTO: 9.5 X10*3/UL (ref 4.4–11.3)

## 2024-02-27 PROCEDURE — 2500000004 HC RX 250 GENERAL PHARMACY W/ HCPCS (ALT 636 FOR OP/ED): Performed by: INTERNAL MEDICINE

## 2024-02-27 PROCEDURE — 94760 N-INVAS EAR/PLS OXIMETRY 1: CPT

## 2024-02-27 PROCEDURE — 99232 SBSQ HOSP IP/OBS MODERATE 35: CPT | Performed by: INTERNAL MEDICINE

## 2024-02-27 PROCEDURE — 99232 SBSQ HOSP IP/OBS MODERATE 35: CPT | Performed by: STUDENT IN AN ORGANIZED HEALTH CARE EDUCATION/TRAINING PROGRAM

## 2024-02-27 PROCEDURE — C9113 INJ PANTOPRAZOLE SODIUM, VIA: HCPCS | Performed by: INTERNAL MEDICINE

## 2024-02-27 PROCEDURE — 82947 ASSAY GLUCOSE BLOOD QUANT: CPT

## 2024-02-27 PROCEDURE — 93272 ECG/REVIEW INTERPRET ONLY: CPT | Performed by: INTERNAL MEDICINE

## 2024-02-27 PROCEDURE — 1200000002 HC GENERAL ROOM WITH TELEMETRY DAILY

## 2024-02-27 PROCEDURE — 99231 SBSQ HOSP IP/OBS SF/LOW 25: CPT | Performed by: NURSE PRACTITIONER

## 2024-02-27 PROCEDURE — 36415 COLL VENOUS BLD VENIPUNCTURE: CPT | Performed by: INTERNAL MEDICINE

## 2024-02-27 PROCEDURE — 93306 TTE W/DOPPLER COMPLETE: CPT

## 2024-02-27 PROCEDURE — 85027 COMPLETE CBC AUTOMATED: CPT | Performed by: INTERNAL MEDICINE

## 2024-02-27 PROCEDURE — 93270 REMOTE 30 DAY ECG REV/REPORT: CPT

## 2024-02-27 RX ADMIN — IRON SUCROSE 200 MG: 20 INJECTION, SOLUTION INTRAVENOUS at 05:53

## 2024-02-27 RX ADMIN — PANTOPRAZOLE SODIUM 40 MG: 40 INJECTION, POWDER, FOR SOLUTION INTRAVENOUS at 20:23

## 2024-02-27 RX ADMIN — PANTOPRAZOLE SODIUM 40 MG: 40 INJECTION, POWDER, FOR SOLUTION INTRAVENOUS at 09:54

## 2024-02-27 ASSESSMENT — COGNITIVE AND FUNCTIONAL STATUS - GENERAL
MOBILITY SCORE: 24
DAILY ACTIVITIY SCORE: 24
MOBILITY SCORE: 23
CLIMB 3 TO 5 STEPS WITH RAILING: A LITTLE
DAILY ACTIVITIY SCORE: 24

## 2024-02-27 ASSESSMENT — PAIN SCALES - GENERAL
PAINLEVEL_OUTOF10: 0 - NO PAIN
PAINLEVEL_OUTOF10: 0 - NO PAIN

## 2024-02-27 ASSESSMENT — PAIN - FUNCTIONAL ASSESSMENT: PAIN_FUNCTIONAL_ASSESSMENT: 0-10

## 2024-02-27 NOTE — PROGRESS NOTES
"Carson Art Jr. is a 49 y.o. male on day 2 of admission presenting with GI bleed.      Subjective   Echocardiogram noting ejection fraction of 60 to 65% with a technically inadequate bubble study to rule out filling defect.  Recommend following up with cardiology for these findings in the outpatient setting.  Neurology to sign off on care.       Objective     Last Recorded Vitals  Blood pressure 137/82, pulse 104, temperature 36.9 °C (98.4 °F), temperature source Temporal, resp. rate 18, height 1.905 m (6' 3\"), weight 104 kg (230 lb), SpO2 99 %.    Relevant Results  Scheduled medications  iron sucrose, 200 mg, intravenous, Daily  pantoprazole, 40 mg, intravenous, BID      Continuous medications     PRN medications  PRN medications: acetaminophen, ondansetron, oxygen, polyethylene glycol  Transthoracic Echo (TTE) Complete    Result Date: 2/27/2024    [facility], [address] [place] [address] [id number] [phone] and                                 Fax [phone] TRANSTHORACIC ECHOCARDIOGRAM REPORT  Patient Name:      CARSON ART      Reading Physician:    [id number] Gustavo Overton MD Study Date:        2/27/2024            Ordering Provider:    [id number] AL WALSH MRN/PID:           [id number]             Fellow: Accession#:        [id number]         Nurse:                Daphne Parker Date of Birth/Age: 1975 / 49 years Sonographer:          Mauricio Sal [facility],                                                               DIANA, AKIRA Gender:            M                    Additional Staff: Height:            190.50 cm            Admit Date:           2/24/2024 Weight:            104.33 kg            Admission Status:     Inpatient -                                                               Routine BSA / BMI:         2.33 m2 / 28.75      Encounter#:           [id number]  "                   kg/m2                                         Department Location:  [facility] Blood Pressure: 133 /74 mmHg Study Type:    TRANSTHORACIC ECHO (TTE) COMPLETE Diagnosis/ICD: 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 Study Detail: The following Echo studies were performed: 2D, M-Mode, Doppler and               color flow. Technically challenging study due to body habitus and               prominent lung artifact. Agitated saline used as a contrast agent               for intraseptal flow evaluation.  PHYSICIAN INTERPRETATION: Left Ventricle: The left ventricular systolic function is normal, with an estimated ejection fraction of 60-65%. There are no regional wall motion abnormalities. The left ventricular cavity size is normal. Spectral Doppler shows a normal pattern of left ventricular diastolic filling. Left Atrium: The left atrium is normal in size. A bubble study using agitated saline was technically inadequate to determine an atrial septal defect. Right Ventricle: The right ventricle is normal in size. There is normal right ventricular global systolic function. Right Atrium: The right atrium is normal in size. Aortic Valve: The aortic valve appears structurally normal. There is no evidence of aortic valve regurgitation. The peak instantaneous gradient of the aortic valve is 10.4 mmHg. The mean gradient of the aortic valve is 6.0 mmHg. Mitral Valve: The mitral valve is normal in structure. There is no evidence of mitral valve regurgitation. Tricuspid Valve: The tricuspid valve is structurally normal. No evidence of tricuspid regurgitation. Pulmonic Valve: The pulmonic valve is structurally normal. There is physiologic pulmonic valve regurgitation. Pericardium: There is no pericardial effusion noted. Aorta: The aortic root is  normal. Systemic Veins: The inferior vena cava was not well visualized.  CONCLUSIONS:  1. Left ventricular systolic function is normal with a 60-65% estimated ejection fraction. QUANTITATIVE DATA SUMMARY: 2D MEASUREMENTS:                           Normal Ranges: Ao Root d:     3.20 cm    (2.0-3.7cm) LAs:           4.50 cm    (2.7-4.0cm) IVSd:          1.15 cm    (0.6-1.1cm) LVPWd:         1.17 cm    (0.6-1.1cm) LVIDd:         5.20 cm    (3.9-5.9cm) LVIDs:         3.66 cm LV Mass Index: 102.0 g/m2 LV % FS        29.6 % LA VOLUME:                               Normal Ranges: LA Vol A4C:        48.7 ml    (22+/-6mL/m2) LA Vol A2C:        60.7 ml LA Vol BP:         57.7 ml LA Vol Index A4C:  20.9ml/m2 LA Vol Index A2C:  26.1 ml/m2 LA Vol Index BP:   24.8 ml/m2 LA Area A4C:       19.0 cm2 LA Area A2C:       20.0 cm2 LA Major Axis A4C: 6.3 cm LA Major Axis A2C: 5.6 cm LA Volume Index:   23.0 ml/m2 RA VOLUME BY A/L METHOD:                       Normal Ranges: RA Area A4C: 21.0 cm2 M-MODE MEASUREMENTS:                  Normal Ranges: Ao Root: 3.40 cm (2.0-3.7cm) LAs:     4.80 cm (2.7-4.0cm) AORTA MEASUREMENTS:                    Normal Ranges: Asc Ao, d: 3.50 cm (2.1-3.4cm) LV SYSTOLIC FUNCTION BY 2D PLANIMETRY (MOD):                     Normal Ranges: EF-A4C View: 75.7 % (>=55%) EF-A2C View: 63.0 % EF-Biplane:  70.3 % LV DIASTOLIC FUNCTION:                        Normal Ranges: MV Peak E:    1.05 m/s (0.7-1.2 m/s) MV Peak A:    1.13 m/s (0.42-0.7 m/s) E/A Ratio:    0.93     (1.0-2.2) MV lateral e' 0.13 m/s MITRAL VALVE:                 Normal Ranges: MV DT: 139 msec (150-240msec) AORTIC VALVE:                                    Normal Ranges: AoV Vmax:                1.61 m/s  (<=1.7m/s) AoV Peak PG:             10.4 mmHg (<20mmHg) AoV Mean P.0 mmHg  (1.7-11.5mmHg) LVOT Max Manish:            1.21 m/s  (<=1.1m/s) AoV VTI:                 27.70 cm  (18-25cm) LVOT VTI:                23.00 cm LVOT Diameter:            2.40 cm   (1.8-2.4cm) AoV Area, VTI:           3.76 cm2  (2.5-5.5cm2) AoV Area,Vmax:           3.40 cm2  (2.5-4.5cm2) AoV Dimensionless Index: 0.83  RIGHT VENTRICLE: RV Basal 3.85 cm TAPSE:   33.2 mm RV s'    0.19 m/s PULMONIC VALVE:                      Normal Ranges: PV Max Manish: 1.4 m/s  (0.6-0.9m/s) PV Max P.7 mmHg  [id number] Gustavo Overton MD Electronically signed on 2024 at 11:12:48 AM  ** Final **     EGD    Result Date: 2024  Table formatting from the original result was not included. Impression Single ulcer in the esophagus with clean base (Erick III) Small type I hiatal hernia Single ulcer in the prepyloric region with clean base (Erick III); performed cold forceps biopsy Performed forceps biopsies in the stomach to rule out H. pylori Mild edematous and erythematous mucosa in the duodenal bulb The 1st part of the duodenum and 2nd part of the duodenum appeared normal. Findings Single large, cratered ulcer in the esophagus with clean base (Erick III) Small sliding hiatal hernia (type I hiatal hernia). Hill grade III hiatal hernia Single 10 mm cratered ulcer in the prepyloric region with clean base (Erick III); performed cold forceps biopsy Performed forceps biopsies in the stomach to rule out H. pylori Mild, localized edematous and erythematous mucosa in the duodenal bulb; The 1st part of the duodenum and 2nd part of the duodenum appeared normal. Recommendation  Await pathology results  Repeat EGD in 2 months  Avoid NSAIDs Recommend discharge home on high dose Omeprazole 40 mg BID Would recommend repeating EGD in 2 month to check on healing of esophageal and gastric ulcer and to evaluate for Swanson's esophagus  Indication Gastrointestinal hemorrhage with melena Staff Staff Role Ilan Bell MD Proceduralist Medications See Anesthesia Record. Preprocedure A history and physical has been performed, and patient medication allergies have been reviewed. The patient's tolerance of previous  anesthesia has been reviewed. The risks and benefits of the procedure and the sedation options and risks were discussed with the patient. All questions were answered and informed consent obtained. Details of the Procedure The patient underwent monitored anesthesia care, which was administered by an anesthesia professional. The patient's blood pressure, ECG, ETCO2, heart rate, level of consciousness, oxygen and respirations were monitored throughout the procedure. The scope was introduced through the mouth and advanced to the second part of the duodenum. Retroflexion was performed in the cardia. The patient experienced no blood loss. The procedure was not difficult. The patient tolerated the procedure well. There were no apparent adverse events. Events Procedure Events Event Event Time ENDO SCOPE IN TIME 2/26/2024  2:39 PM ENDO SCOPE OUT TIME 2/26/2024  2:45 PM Specimens ID Type Source Tests Collected by Time 1 : gastric ulcer bx's, cold bx Tissue STOMACH ANTRUM BIOPSY SURGICAL PATHOLOGY EXAM Ilan Bell MD 2/26/2024 1442 2 : random gastric bx's, cold bx Tissue STOMACH BODY/CORPUS BIOPSY SURGICAL PATHOLOGY EXAM Ilan Bell MD 2/26/2024 1444 Procedure Location [facility] [address] [facility] [address] [phone] Referring Provider No referring provider defined for this encounter. Procedure Provider No name on file     MR brain wo IV contrast    Result Date: 2/26/2024  Interpreted By:  Sebastián Shahid, STUDY: MR BRAIN WO IV CONTRAST; MR ANGIO HEAD WO IV CONTRAST; MR ANGIO NECK WO IV CONTRAST   INDICATION: Signs/Symptoms:stroke, expressive aphasia; Signs/Symptoms:expressive aphasia   COMPARISON:   Head CT performed 02/25/2024   ACCESSION NUMBER(S): [id number]; [id number]; [id number]   ORDERING CLINICIAN: HUBERT RHOADES   TECHNIQUE: Multi-planar multi-sequential MR imaging of the brain was performed without intravenous contrast. MRA of the head using time-of-flight  technique was performed without intravenous contrast. MRA of the neck using time-of-flight technique was performed without intravenous contrast.   FINDINGS: MRI BRAIN:   No acute infarction, intracranial hemorrhage or mass lesion.   No hydrocephalus.  No extra-axial fluid collections. The skull base flow voids are present.   The visualized intraorbital contents are normal. The imaged portions of the paranasal sinuses are clear. The mastoid air cells are clear. The visualized osseous structures, soft tissues and partially visualized parotid glands appear normal. Rounded FLAIR hyperintense lesion within the left parasagittal aspect of the clivus (series 8, image 7) likely represents hemangioma.   MRA NECK:   Standard configuration of the aortic arch with no gross ostial stenosis of the great vessels.   The common carotid arteries are patent bilaterally without evidence of stenosis. There is no narrowing of the cervical internal carotid arteries bilaterally.   The vertebral arteries are patent bilaterally throughout their course.   MRA HEAD:   Normal distal internal carotid arteries.   Moderate focal stenosis of the proximal right A2 segment (series 5, image 29); there is immediate reconstitution. Remainder of the visualized right MONIQUE is normal. Left MONIQUE as well as the bilateral MCAs and PCAs are normal. Fetal origin of the left PCA.   Normal vertebrobasilar system.   No evidence of vascular stenosis, occlusion, aneurysm or vascular malformation.       No acute intracranial abnormality.   Moderate focal stenosis of the proximal right A2 segment of the MONIQUE with immediate reconstitution. Otherwise, cervical and intracranial vasculature is within normal limits.   _____________ NASCET criteria for internal carotid artery stenosis: Mild: 0% to 49% Moderate: 50% to 69% Severe: 70% to 99% Complete Occlusion   Signed by: Sebastián Shahid 2/26/2024 8:56 AM Dictation workstation:   [id number]    MR angio neck wo IV  contrast    Result Date: 2/26/2024  Interpreted By:  Sebastián Shahid, STUDY: MR BRAIN WO IV CONTRAST; MR ANGIO HEAD WO IV CONTRAST; MR ANGIO NECK WO IV CONTRAST   INDICATION: Signs/Symptoms:stroke, expressive aphasia; Signs/Symptoms:expressive aphasia   COMPARISON:   Head CT performed 02/25/2024   ACCESSION NUMBER(S): [id number]; [id number]; [id number]   ORDERING CLINICIAN: HUBERT RHOADES   TECHNIQUE: Multi-planar multi-sequential MR imaging of the brain was performed without intravenous contrast. MRA of the head using time-of-flight technique was performed without intravenous contrast. MRA of the neck using time-of-flight technique was performed without intravenous contrast.   FINDINGS: MRI BRAIN:   No acute infarction, intracranial hemorrhage or mass lesion.   No hydrocephalus.  No extra-axial fluid collections. The skull base flow voids are present.   The visualized intraorbital contents are normal. The imaged portions of the paranasal sinuses are clear. The mastoid air cells are clear. The visualized osseous structures, soft tissues and partially visualized parotid glands appear normal. Rounded FLAIR hyperintense lesion within the left parasagittal aspect of the clivus (series 8, image 7) likely represents hemangioma.   MRA NECK:   Standard configuration of the aortic arch with no gross ostial stenosis of the great vessels.   The common carotid arteries are patent bilaterally without evidence of stenosis. There is no narrowing of the cervical internal carotid arteries bilaterally.   The vertebral arteries are patent bilaterally throughout their course.   MRA HEAD:   Normal distal internal carotid arteries.   Moderate focal stenosis of the proximal right A2 segment (series 5, image 29); there is immediate reconstitution. Remainder of the visualized right MONIQUE is normal. Left MONIQUE as well as the bilateral MCAs and PCAs are normal. Fetal origin of the left PCA.   Normal vertebrobasilar system.   No evidence of  vascular stenosis, occlusion, aneurysm or vascular malformation.       No acute intracranial abnormality.   Moderate focal stenosis of the proximal right A2 segment of the MONIQUE with immediate reconstitution. Otherwise, cervical and intracranial vasculature is within normal limits.   _____________ NASCET criteria for internal carotid artery stenosis: Mild: 0% to 49% Moderate: 50% to 69% Severe: 70% to 99% Complete Occlusion   Signed by: Sebastián Shahid 2/26/2024 8:56 AM Dictation workstation:   [id number]    MR angio head wo IV contrast    Result Date: 2/26/2024  Interpreted By:  Sebastián Shahid, STUDY: MR BRAIN WO IV CONTRAST; MR ANGIO HEAD WO IV CONTRAST; MR ANGIO NECK WO IV CONTRAST   INDICATION: Signs/Symptoms:stroke, expressive aphasia; Signs/Symptoms:expressive aphasia   COMPARISON:   Head CT performed 02/25/2024   ACCESSION NUMBER(S): [id number]; [id number]; [id number]   ORDERING CLINICIAN: HUBERT RHOADES   TECHNIQUE: Multi-planar multi-sequential MR imaging of the brain was performed without intravenous contrast. MRA of the head using time-of-flight technique was performed without intravenous contrast. MRA of the neck using time-of-flight technique was performed without intravenous contrast.   FINDINGS: MRI BRAIN:   No acute infarction, intracranial hemorrhage or mass lesion.   No hydrocephalus.  No extra-axial fluid collections. The skull base flow voids are present.   The visualized intraorbital contents are normal. The imaged portions of the paranasal sinuses are clear. The mastoid air cells are clear. The visualized osseous structures, soft tissues and partially visualized parotid glands appear normal. Rounded FLAIR hyperintense lesion within the left parasagittal aspect of the clivus (series 8, image 7) likely represents hemangioma.   MRA NECK:   Standard configuration of the aortic arch with no gross ostial stenosis of the great vessels.   The common carotid arteries are patent bilaterally without  evidence of stenosis. There is no narrowing of the cervical internal carotid arteries bilaterally.   The vertebral arteries are patent bilaterally throughout their course.   MRA HEAD:   Normal distal internal carotid arteries.   Moderate focal stenosis of the proximal right A2 segment (series 5, image 29); there is immediate reconstitution. Remainder of the visualized right MONIQUE is normal. Left MONIQUE as well as the bilateral MCAs and PCAs are normal. Fetal origin of the left PCA.   Normal vertebrobasilar system.   No evidence of vascular stenosis, occlusion, aneurysm or vascular malformation.       No acute intracranial abnormality.   Moderate focal stenosis of the proximal right A2 segment of the MONIQUE with immediate reconstitution. Otherwise, cervical and intracranial vasculature is within normal limits.   _____________ NASCET criteria for internal carotid artery stenosis: Mild: 0% to 49% Moderate: 50% to 69% Severe: 70% to 99% Complete Occlusion   Signed by: Sebastián Shahid 2/26/2024 8:56 AM Dictation workstation:   [id number]    CT brain attack head wo IV contrast    Result Date: 2/25/2024  Interpreted By:  Cleo Benjamin, STUDY: CT BRAIN ATTACK HEAD WO IV CONTRAST;  2/25/2024 12:13 am   INDICATION: Signs/Symptoms:stroke alert.   COMPARISON: None.   ACCESSION NUMBER(S): [id number]   ORDERING CLINICIAN: NASIM GRUBBS   TECHNIQUE: Noncontrast CT axial images were obtained through the brain.  Coronal and sagittal reformats were performed.   FINDINGS: The ventricles, sulci and basal cisterns are within normal limits. The grey-white matter differentiation is intact. No acute territorial infarct.  There is no mass effect or midline shift. There is no extraaxial fluid collection.  There is no intracranial hemorrhage. No depressed calvarial fracture. No air-fluid levels at the visualized paranasal sinuses. The mastoid air cells are clear.       1.  No acute intracranial hemorrhage or acute territorial infarct.          MACRO: Cleo Benjamin discussed the significance and urgency of this critical finding by telephone with  Dr. Hobbs on 2/25/2024 at 12:27 am.  (**-RCF-**) Findings:  See findings.   Signed by: Cleo Benjamin 2/25/2024 12:28 AM Dictation workstation:   [id number]   Results for orders placed or performed during the hospital encounter of 02/24/24 (from the past 24 hour(s))   POCT GLUCOSE   Result Value Ref Range    POCT Glucose 88 74 - 99 mg/dL   POCT GLUCOSE   Result Value Ref Range    POCT Glucose 87 74 - 99 mg/dL   POCT GLUCOSE   Result Value Ref Range    POCT Glucose 86 74 - 99 mg/dL   POCT GLUCOSE   Result Value Ref Range    POCT Glucose 89 74 - 99 mg/dL   CBC   Result Value Ref Range    WBC 9.5 4.4 - 11.3 x10*3/uL    nRBC 0.0 0.0 - 0.0 /100 WBCs    RBC 2.31 (L) 4.50 - 5.90 x10*6/uL    Hemoglobin 7.2 (L) 13.5 - 17.5 g/dL    Hematocrit 22.5 (L) 41.0 - 52.0 %    MCV 97 80 - 100 fL    MCH 31.2 26.0 - 34.0 pg    MCHC 32.0 32.0 - 36.0 g/dL    RDW 12.5 11.5 - 14.5 %    Platelets 271 150 - 450 x10*3/uL   SST TOP   Result Value Ref Range    Extra Tube Hold for add-ons.    Transthoracic Echo (TTE) Complete   Result Value Ref Range    AV pk kevon 1.61 m/s    AV mn grad 6.0 mmHg    LVOT diam 2.40 cm    LV biplane EF 70 %    MV E/A ratio 0.93     LA vol index A/L 24.8 ml/m2    Tricuspid annular plane systolic excursion 3.3 cm    RV free wall pk S' 19.40 cm/s    LVIDd 5.20 cm    Aortic Valve Area by Continuity of VTI 3.76 cm2    Aortic Valve Area by Continuity of Peak Velocity 3.40 cm2    AV pk grad 10.4 mmHg    LV A4C EF 75.7    POCT GLUCOSE   Result Value Ref Range    POCT Glucose 108 (H) 74 - 99 mg/dL                         [place] Coma Scale  Best Eye Response: Spontaneous  Best Verbal Response: Oriented  Best Motor Response: Follows commands  [place] Coma Scale Score: 15                             Assessment/Plan      Principal Problem:    GI bleed  Active Problems:    Gastrointestinal hemorrhage, unspecified  gastrointestinal hemorrhage type  Syncopal episode  -Patient to continue holding all antiplatelet/anticoagulant medications at this time given active GI bleeding.  Will defer reinitiation of this medication to GI team once diagnostic testing is complete.  Patient is scheduled for EGD later today.  -Continue atorvastatin 80 mg p.o. daily for additional CVA prophylaxis  -Recommendations for needs during hospitalization and at discharge via PT and OT  -Continue promotion of lifestyle modifications, such as: Strict BP and glycemic control, dietary habit changes, incorporation of daily exercise regimen, adherence to all prescription/OTC medication schedules, attendance to all follow-up appointments, cessation from smoking if applicable, abstinence from alcohol and illicit drug use if applicable  -Patient to follow-up with PCP in 1 to 2 weeks postdischarge  -Patient to follow-up with neurosurgery in the outpatient setting in order to 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 It is recommended for patient to wear 14-day cardiac event monitor at discharge to rule out evidence of cardiac arrhythmias that could have provoked syncopal episodes.     Total face-to-face time spent with patient today was approximately 15 minutes; more than 50% of my time was spent counseling patient on: preparation to see patient via chart review of resulted laboratory values, radiographic imaging, prescribed medications, and impairment of involved organ systems. Time also spent on medical examination, placing appropriate orders for testing/medications, communicating with other health care providers, counseling/educating the patient/family, and care coordination.    Neurology to sign off at this time. Thank you for the opportunity to be a part of this patient's  multidisciplinary treatment team.  If any additional questions or concerns arise, please do not hesitate to contact me or the on-call neurologist via [employer].     *This note was created using voice recognition transcription software. Despite proofreading, unintentional typographical errors may be present. Please contact the department of neurology with any questions or concerns.    Zoya Wolf, ELISA-CNP

## 2024-02-27 NOTE — PROGRESS NOTES
"Carson Art Jr. is a 49 y.o. male on day 2 of admission presenting with GI bleed.    Subjective   Patient is resting comfortably no new complaints he had a bowel movement which was brown in color       Objective     Physical Exam he is alert and oriented x 3 patient rather obese no tachycardia lungs are clear abdomen is bloated no localized tenderness present    Last Recorded Vitals  Blood pressure 129/71, pulse 94, temperature 36.8 °C (98.2 °F), resp. rate 18, height 1.905 m (6' 3\"), weight 104 kg (230 lb), SpO2 94 %.  Intake/Output last 3 Shifts:  I/O last 3 completed shifts:  In: 200 (1.9 mL/kg) [I.V.:200 (1.9 mL/kg)]  Out: 275 (2.6 mL/kg) [Urine:275 (0.1 mL/kg/hr)]  Weight: 104.3 kg     Relevant Results    Results for orders placed or performed during the hospital encounter of 02/24/24 (from the past 96 hour(s))   Prepare RBC: 2 Units   Result Value Ref Range    PRODUCT CODE N9183M83     Unit Number [id number]     Unit ABO A     Unit RH POS     XM INTEP COMP     Dispense Status XM     Blood Expiration Date March 22, 2024 23:59 EDT     PRODUCT BLOOD TYPE 6200     UNIT VOLUME 350     PRODUCT CODE F0733N95     Unit Number [id number]     Unit ABO A     Unit RH POS     XM INTEP COMP     Dispense Status XM     Blood Expiration Date March 22, 2024 23:59 EDT     PRODUCT BLOOD TYPE 6200     UNIT VOLUME 350    CBC and Auto Differential   Result Value Ref Range    WBC 15.2 (H) 4.4 - 11.3 x10*3/uL    nRBC 0.0 0.0 - 0.0 /100 WBCs    RBC 3.35 (L) 4.50 - 5.90 x10*6/uL    Hemoglobin 10.7 (L) 13.5 - 17.5 g/dL    Hematocrit 31.3 (L) 41.0 - 52.0 %    MCV 93 80 - 100 fL    MCH 31.9 26.0 - 34.0 pg    MCHC 34.2 32.0 - 36.0 g/dL    RDW 12.1 11.5 - 14.5 %    Platelets 324 150 - 450 x10*3/uL    Neutrophils % 84.0 40.0 - 80.0 %    Immature Granulocytes %, Automated 0.7 0.0 - 0.9 %    Lymphocytes % 10.6 13.0 - 44.0 %    Monocytes % 4.5 2.0 - 10.0 %    Eosinophils % 0.0 0.0 - 6.0 %    Basophils % 0.2 0.0 - 2.0 %    " Neutrophils Absolute 12.76 (H) 1.20 - 7.70 x10*3/uL    Immature Granulocytes Absolute, Automated 0.11 0.00 - 0.70 x10*3/uL    Lymphocytes Absolute 1.61 1.20 - 4.80 x10*3/uL    Monocytes Absolute 0.69 0.10 - 1.00 x10*3/uL    Eosinophils Absolute 0.00 0.00 - 0.70 x10*3/uL    Basophils Absolute 0.03 0.00 - 0.10 x10*3/uL   Comprehensive metabolic panel   Result Value Ref Range    Glucose 159 (H) 74 - 99 mg/dL    Sodium 141 136 - 145 mmol/L    Potassium 4.3 3.5 - 5.3 mmol/L    Chloride 112 (H) 98 - 107 mmol/L    Bicarbonate 23 21 - 32 mmol/L    Anion Gap 10 10 - 20 mmol/L    Urea Nitrogen 53 (H) 6 - 23 mg/dL    Creatinine 1.11 0.50 - 1.30 mg/dL    eGFR 81 >60 mL/min/1.73m*2    Calcium 8.0 (L) 8.6 - 10.3 mg/dL    Albumin 3.3 (L) 3.4 - 5.0 g/dL    Alkaline Phosphatase 72 33 - 120 U/L    Total Protein 5.5 (L) 6.4 - 8.2 g/dL    AST 8 (L) 9 - 39 U/L    Bilirubin, Total 0.6 0.0 - 1.2 mg/dL    ALT 16 10 - 52 U/L   Lactate   Result Value Ref Range    Lactate 2.3 (H) 0.4 - 2.0 mmol/L   Protime-INR   Result Value Ref Range    Protime 14.1 (H) 9.8 - 12.8 seconds    INR 1.3 (H) 0.9 - 1.1   Troponin I, High Sensitivity   Result Value Ref Range    Troponin I, High Sensitivity 71 (HH) 0 - 20 ng/L   Type And Screen   Result Value Ref Range    ABO TYPE A     Rh TYPE POS     ANTIBODY SCREEN NEG    VERIFY ABO/Rh Group Test   Result Value Ref Range    ABO TYPE A     Rh TYPE POS    Lavender Top   Result Value Ref Range    Extra Tube Hold for add-ons.    Lactate   Result Value Ref Range    Lactate 1.9 0.4 - 2.0 mmol/L   Hemoglobin and hematocrit, blood   Result Value Ref Range    Hemoglobin 10.3 (L) 13.5 - 17.5 g/dL    Hematocrit 30.9 (L) 41.0 - 52.0 %   Troponin I, High Sensitivity   Result Value Ref Range    Troponin I, High Sensitivity 96 (HH) 0 - 20 ng/L   POCT GLUCOSE   Result Value Ref Range    POCT Glucose 127 (H) 74 - 99 mg/dL   Hemoglobin and hematocrit, blood   Result Value Ref Range    Hemoglobin 9.4 (L) 13.5 - 17.5 g/dL     Hematocrit 28.7 (L) 41.0 - 52.0 %   Lipid Panel   Result Value Ref Range    Cholesterol 138 0 - 199 mg/dL    HDL-Cholesterol 21.8 mg/dL    Cholesterol/HDL Ratio 6.3     LDL Calculated 85 <=99 mg/dL    VLDL 32 0 - 40 mg/dL    Triglycerides 158 (H) 0 - 149 mg/dL    Non HDL Cholesterol 116 0 - 149 mg/dL   Hemoglobin A1C   Result Value Ref Range    Hemoglobin A1C 5.3 see below %    Estimated Average Glucose 105 Not Established mg/dL   B-Type Natriuretic Peptide   Result Value Ref Range    BNP 17 0 - 99 pg/mL   CBC   Result Value Ref Range    WBC 16.0 (H) 4.4 - 11.3 x10*3/uL    nRBC 0.0 0.0 - 0.0 /100 WBCs    RBC 2.85 (L) 4.50 - 5.90 x10*6/uL    Hemoglobin 9.0 (L) 13.5 - 17.5 g/dL    Hematocrit 27.2 (L) 41.0 - 52.0 %    MCV 95 80 - 100 fL    MCH 31.6 26.0 - 34.0 pg    MCHC 33.1 32.0 - 36.0 g/dL    RDW 12.5 11.5 - 14.5 %    Platelets 287 150 - 450 x10*3/uL   Basic metabolic panel   Result Value Ref Range    Glucose 126 (H) 74 - 99 mg/dL    Sodium 145 136 - 145 mmol/L    Potassium 4.2 3.5 - 5.3 mmol/L    Chloride 116 (H) 98 - 107 mmol/L    Bicarbonate 22 21 - 32 mmol/L    Anion Gap 11 10 - 20 mmol/L    Urea Nitrogen 43 (H) 6 - 23 mg/dL    Creatinine 0.96 0.50 - 1.30 mg/dL    eGFR >90 >60 mL/min/1.73m*2    Calcium 7.8 (L) 8.6 - 10.3 mg/dL   Troponin I, High Sensitivity   Result Value Ref Range    Troponin I, High Sensitivity 43 (H) 0 - 20 ng/L   POCT GLUCOSE   Result Value Ref Range    POCT Glucose 134 (H) 74 - 99 mg/dL   POCT GLUCOSE   Result Value Ref Range    POCT Glucose 109 (H) 74 - 99 mg/dL   POCT GLUCOSE   Result Value Ref Range    POCT Glucose 101 (H) 74 - 99 mg/dL   Hemoglobin and hematocrit, blood   Result Value Ref Range    Hemoglobin 7.8 (L) 13.5 - 17.5 g/dL    Hematocrit 23.6 (L) 41.0 - 52.0 %   POCT GLUCOSE   Result Value Ref Range    POCT Glucose 79 74 - 99 mg/dL   CBC   Result Value Ref Range    WBC 9.4 4.4 - 11.3 x10*3/uL    nRBC 0.0 0.0 - 0.0 /100 WBCs    RBC 2.38 (L) 4.50 - 5.90 x10*6/uL    Hemoglobin 7.5  (L) 13.5 - 17.5 g/dL    Hematocrit 23.2 (L) 41.0 - 52.0 %    MCV 98 80 - 100 fL    MCH 31.5 26.0 - 34.0 pg    MCHC 32.3 32.0 - 36.0 g/dL    RDW 12.7 11.5 - 14.5 %    Platelets 220 150 - 450 x10*3/uL   SST TOP   Result Value Ref Range    Extra Tube Hold for add-ons.    POCT GLUCOSE   Result Value Ref Range    POCT Glucose 56 (L) 74 - 99 mg/dL   POCT GLUCOSE   Result Value Ref Range    POCT Glucose 88 74 - 99 mg/dL   POCT GLUCOSE   Result Value Ref Range    POCT Glucose 87 74 - 99 mg/dL   POCT GLUCOSE   Result Value Ref Range    POCT Glucose 86 74 - 99 mg/dL   POCT GLUCOSE   Result Value Ref Range    POCT Glucose 89 74 - 99 mg/dL   CBC   Result Value Ref Range    WBC 9.5 4.4 - 11.3 x10*3/uL    nRBC 0.0 0.0 - 0.0 /100 WBCs    RBC 2.31 (L) 4.50 - 5.90 x10*6/uL    Hemoglobin 7.2 (L) 13.5 - 17.5 g/dL    Hematocrit 22.5 (L) 41.0 - 52.0 %    MCV 97 80 - 100 fL    MCH 31.2 26.0 - 34.0 pg    MCHC 32.0 32.0 - 36.0 g/dL    RDW 12.5 11.5 - 14.5 %    Platelets 271 150 - 450 x10*3/uL   SST TOP   Result Value Ref Range    Extra Tube Hold for add-ons.    Transthoracic Echo (TTE) Complete   Result Value Ref Range    AV pk kevon 1.61 m/s    AV mn grad 6.0 mmHg    LVOT diam 2.40 cm    LV biplane EF 70 %    MV E/A ratio 0.93     LA vol index A/L 24.8 ml/m2    Tricuspid annular plane systolic excursion 3.3 cm    RV free wall pk S' 19.40 cm/s    LVIDd 5.20 cm    Aortic Valve Area by Continuity of VTI 3.76 cm2    Aortic Valve Area by Continuity of Peak Velocity 3.40 cm2    AV pk grad 10.4 mmHg    LV A4C EF 75.7    POCT GLUCOSE   Result Value Ref Range    POCT Glucose 108 (H) 74 - 99 mg/dL   Holter Or Event Cardiac Monitor   Result Value Ref Range    BSA 2.35 m2   POCT GLUCOSE   Result Value Ref Range    POCT Glucose 160 (H) 74 - 99 mg/dL      Transthoracic Echo (TTE) Complete    Result Date: 2/27/2024    [facility], [address], [facility] [address] [phone] and                                 Fax [phone]  TRANSTHORACIC ECHOCARDIOGRAM REPORT  Patient Name:      VERITO ARORA NADIR Cruz Physician:    [id number] Gustavo Overton MD Study Date:        2/27/2024            Ordering Provider:    [id number] AL WALSH MRN/PID:           [id number]             Fellow: Accession#:        [id number]         Nurse:                Daphne Parker Date of Birth/Age: 1975 / 49 years Sonographer:          Mauricio Sal ACS,                                                               RD, AKIRA Gender:            M                    Additional Staff: Height:            190.50 cm            Admit Date:           2/24/2024 Weight:            104.33 kg            Admission Status:     Inpatient -                                                               Routine BSA / BMI:         2.33 m2 / 28.75      Encounter#:           [id number]                    kg/m2                                         Department Location:  [facility] Blood Pressure: 133 /74 mmHg Study Type:    TRANSTHORACIC ECHO (TTE) COMPLETE Diagnosis/ICD: 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 Study Detail: The following Echo studies were performed: 2D, M-Mode, Doppler and               color flow. Technically challenging study due to body habitus and               prominent lung artifact. Agitated saline used as a contrast agent               for intraseptal flow evaluation.  PHYSICIAN INTERPRETATION: Left Ventricle: The left ventricular systolic function is normal, with an estimated ejection fraction of 60-65%. There are no regional wall motion abnormalities. The left ventricular cavity size is normal. Spectral Doppler shows a normal pattern of left ventricular  diastolic filling. Left Atrium: The left atrium is normal in size. A bubble study using agitated saline was technically inadequate to determine an atrial septal defect. Right Ventricle: The right ventricle is normal in size. There is normal right ventricular global systolic function. Right Atrium: The right atrium is normal in size. Aortic Valve: The aortic valve appears structurally normal. There is no evidence of aortic valve regurgitation. The peak instantaneous gradient of the aortic valve is 10.4 mmHg. The mean gradient of the aortic valve is 6.0 mmHg. Mitral Valve: The mitral valve is normal in structure. There is no evidence of mitral valve regurgitation. Tricuspid Valve: The tricuspid valve is structurally normal. No evidence of tricuspid regurgitation. Pulmonic Valve: The pulmonic valve is structurally normal. There is physiologic pulmonic valve regurgitation. Pericardium: There is no pericardial effusion noted. Aorta: The aortic root is normal. Systemic Veins: The inferior vena cava was not well visualized.  CONCLUSIONS:  1. Left ventricular systolic function is normal with a 60-65% estimated ejection fraction. QUANTITATIVE DATA SUMMARY: 2D MEASUREMENTS:                           Normal Ranges: Ao Root d:     3.20 cm    (2.0-3.7cm) LAs:           4.50 cm    (2.7-4.0cm) IVSd:          1.15 cm    (0.6-1.1cm) LVPWd:         1.17 cm    (0.6-1.1cm) LVIDd:         5.20 cm    (3.9-5.9cm) LVIDs:         3.66 cm LV Mass Index: 102.0 g/m2 LV % FS        29.6 % LA VOLUME:                               Normal Ranges: LA Vol A4C:        48.7 ml    (22+/-6mL/m2) LA Vol A2C:        60.7 ml LA Vol BP:         57.7 ml LA Vol Index A4C:  20.9ml/m2 LA Vol Index A2C:  26.1 ml/m2 LA Vol Index BP:   24.8 ml/m2 LA Area A4C:       19.0 cm2 LA Area A2C:       20.0 cm2 LA Major Axis A4C: 6.3 cm LA Major Axis A2C: 5.6 cm LA Volume Index:   23.0 ml/m2 RA VOLUME BY A/L METHOD:                       Normal Ranges: RA Area A4C: 21.0 cm2  M-MODE MEASUREMENTS:                  Normal Ranges: Ao Root: 3.40 cm (2.0-3.7cm) LAs:     4.80 cm (2.7-4.0cm) AORTA MEASUREMENTS:                    Normal Ranges: Asc Ao, d: 3.50 cm (2.1-3.4cm) LV SYSTOLIC FUNCTION BY 2D PLANIMETRY (MOD):                     Normal Ranges: EF-A4C View: 75.7 % (>=55%) EF-A2C View: 63.0 % EF-Biplane:  70.3 % LV DIASTOLIC FUNCTION:                        Normal Ranges: MV Peak E:    1.05 m/s (0.7-1.2 m/s) MV Peak A:    1.13 m/s (0.42-0.7 m/s) E/A Ratio:    0.93     (1.0-2.2) MV lateral e' 0.13 m/s MITRAL VALVE:                 Normal Ranges: MV DT: 139 msec (150-240msec) AORTIC VALVE:                                    Normal Ranges: AoV Vmax:                1.61 m/s  (<=1.7m/s) AoV Peak PG:             10.4 mmHg (<20mmHg) AoV Mean P.0 mmHg  (1.7-11.5mmHg) LVOT Max Manish:            1.21 m/s  (<=1.1m/s) AoV VTI:                 27.70 cm  (18-25cm) LVOT VTI:                23.00 cm LVOT Diameter:           2.40 cm   (1.8-2.4cm) AoV Area, VTI:           3.76 cm2  (2.5-5.5cm2) AoV Area,Vmax:           3.40 cm2  (2.5-4.5cm2) AoV Dimensionless Index: 0.83  RIGHT VENTRICLE: RV Basal 3.85 cm TAPSE:   33.2 mm RV s'    0.19 m/s PULMONIC VALVE:                      Normal Ranges: PV Max Manish: 1.4 m/s  (0.6-0.9m/s) PV Max P.7 mmHg  [id number] Gustavo Overton MD Electronically signed on 2024 at 11:12:48 AM  ** Final **     EGD    Result Date: 2024  Table formatting from the original result was not included. Impression Single ulcer in the esophagus with clean base (Erick III) Small type I hiatal hernia Single ulcer in the prepyloric region with clean base (Erick III); performed cold forceps biopsy Performed forceps biopsies in the stomach to rule out H. pylori Mild edematous and erythematous mucosa in the duodenal bulb The 1st part of the duodenum and 2nd part of the duodenum appeared normal. Findings Single large, cratered ulcer in the esophagus with clean base (Erick  III) Small sliding hiatal hernia (type I hiatal hernia). Hill grade III hiatal hernia Single 10 mm cratered ulcer in the prepyloric region with clean base (Erick III); performed cold forceps biopsy Performed forceps biopsies in the stomach to rule out H. pylori Mild, localized edematous and erythematous mucosa in the duodenal bulb; The 1st part of the duodenum and 2nd part of the duodenum appeared normal. Recommendation  Await pathology results  Repeat EGD in 2 months  Avoid NSAIDs Recommend discharge home on high dose Omeprazole 40 mg BID Would recommend repeating EGD in 2 month to check on healing of esophageal and gastric ulcer and to evaluate for Swanson's esophagus  Indication Gastrointestinal hemorrhage with melena Staff Staff Role Ilan Bell MD Proceduralist Medications See Anesthesia Record. Preprocedure A history and physical has been performed, and patient medication allergies have been reviewed. The patient's tolerance of previous anesthesia has been reviewed. The risks and benefits of the procedure and the sedation options and risks were discussed with the patient. All questions were answered and informed consent obtained. Details of the Procedure The patient underwent monitored anesthesia care, which was administered by an anesthesia professional. The patient's blood pressure, ECG, ETCO2, heart rate, level of consciousness, oxygen and respirations were monitored throughout the procedure. The scope was introduced through the mouth and advanced to the second part of the duodenum. Retroflexion was performed in the cardia. The patient experienced no blood loss. The procedure was not difficult. The patient tolerated the procedure well. There were no apparent adverse events. Events Procedure Events Event Event Time ENDO SCOPE IN TIME 2/26/2024  2:39 PM ENDO SCOPE OUT TIME 2/26/2024  2:45 PM Specimens ID Type Source Tests Collected by Time 1 : gastric ulcer bx's, cold bx Tissue STOMACH ANTRUM BIOPSY  SURGICAL PATHOLOGY EXAM Ilan Bell MD 2/26/2024 1442 2 : random gastric bx's, cold bx Tissue STOMACH BODY/CORPUS BIOPSY SURGICAL PATHOLOGY EXAM Ilan Bell MD 2/26/2024 1444 Procedure Location [facility] [address] [facility] [address] [phone] Referring Provider No referring provider defined for this encounter. Procedure Provider No name on file     MR brain wo IV contrast    Result Date: 2/26/2024  Interpreted By:  Sebastián Shahid, STUDY: MR BRAIN WO IV CONTRAST; MR ANGIO HEAD WO IV CONTRAST; MR ANGIO NECK WO IV CONTRAST   INDICATION: Signs/Symptoms:stroke, expressive aphasia; Signs/Symptoms:expressive aphasia   COMPARISON:   Head CT performed 02/25/2024   ACCESSION NUMBER(S): [id number]; [id number]; [id number]   ORDERING CLINICIAN: HUBERT RHOADES   TECHNIQUE: Multi-planar multi-sequential MR imaging of the brain was performed without intravenous contrast. MRA of the head using time-of-flight technique was performed without intravenous contrast. MRA of the neck using time-of-flight technique was performed without intravenous contrast.   FINDINGS: MRI BRAIN:   No acute infarction, intracranial hemorrhage or mass lesion.   No hydrocephalus.  No extra-axial fluid collections. The skull base flow voids are present.   The visualized intraorbital contents are normal. The imaged portions of the paranasal sinuses are clear. The mastoid air cells are clear. The visualized osseous structures, soft tissues and partially visualized parotid glands appear normal. Rounded FLAIR hyperintense lesion within the left parasagittal aspect of the clivus (series 8, image 7) likely represents hemangioma.   MRA NECK:   Standard configuration of the aortic arch with no gross ostial stenosis of the great vessels.   The common carotid arteries are patent bilaterally without evidence of stenosis. There is no narrowing of the cervical internal carotid arteries bilaterally.   The vertebral  arteries are patent bilaterally throughout their course.   MRA HEAD:   Normal distal internal carotid arteries.   Moderate focal stenosis of the proximal right A2 segment (series 5, image 29); there is immediate reconstitution. Remainder of the visualized right MONIQUE is normal. Left MONIQUE as well as the bilateral MCAs and PCAs are normal. Fetal origin of the left PCA.   Normal vertebrobasilar system.   No evidence of vascular stenosis, occlusion, aneurysm or vascular malformation.       No acute intracranial abnormality.   Moderate focal stenosis of the proximal right A2 segment of the MONIQUE with immediate reconstitution. Otherwise, cervical and intracranial vasculature is within normal limits.   _____________ NASCET criteria for internal carotid artery stenosis: Mild: 0% to 49% Moderate: 50% to 69% Severe: 70% to 99% Complete Occlusion   Signed by: Sebastián Shahid 2/26/2024 8:56 AM Dictation workstation:   [id number]    MR angio neck wo IV contrast    Result Date: 2/26/2024  Interpreted By:  Sebastián Shahid, STUDY: MR BRAIN WO IV CONTRAST; MR ANGIO HEAD WO IV CONTRAST; MR ANGIO NECK WO IV CONTRAST   INDICATION: Signs/Symptoms:stroke, expressive aphasia; Signs/Symptoms:expressive aphasia   COMPARISON:   Head CT performed 02/25/2024   ACCESSION NUMBER(S): [id number]; [id number]; [id number]   ORDERING CLINICIAN: HUBERT RHOADES   TECHNIQUE: Multi-planar multi-sequential MR imaging of the brain was performed without intravenous contrast. MRA of the head using time-of-flight technique was performed without intravenous contrast. MRA of the neck using time-of-flight technique was performed without intravenous contrast.   FINDINGS: MRI BRAIN:   No acute infarction, intracranial hemorrhage or mass lesion.   No hydrocephalus.  No extra-axial fluid collections. The skull base flow voids are present.   The visualized intraorbital contents are normal. The imaged portions of the paranasal sinuses are clear. The mastoid air cells  are clear. The visualized osseous structures, soft tissues and partially visualized parotid glands appear normal. Rounded FLAIR hyperintense lesion within the left parasagittal aspect of the clivus (series 8, image 7) likely represents hemangioma.   MRA NECK:   Standard configuration of the aortic arch with no gross ostial stenosis of the great vessels.   The common carotid arteries are patent bilaterally without evidence of stenosis. There is no narrowing of the cervical internal carotid arteries bilaterally.   The vertebral arteries are patent bilaterally throughout their course.   MRA HEAD:   Normal distal internal carotid arteries.   Moderate focal stenosis of the proximal right A2 segment (series 5, image 29); there is immediate reconstitution. Remainder of the visualized right MONIQUE is normal. Left MONIQUE as well as the bilateral MCAs and PCAs are normal. Fetal origin of the left PCA.   Normal vertebrobasilar system.   No evidence of vascular stenosis, occlusion, aneurysm or vascular malformation.       No acute intracranial abnormality.   Moderate focal stenosis of the proximal right A2 segment of the MONIQUE with immediate reconstitution. Otherwise, cervical and intracranial vasculature is within normal limits.   _____________ NASCET criteria for internal carotid artery stenosis: Mild: 0% to 49% Moderate: 50% to 69% Severe: 70% to 99% Complete Occlusion   Signed by: Sebastián Shahid 2/26/2024 8:56 AM Dictation workstation:   [id number]    MR angio head wo IV contrast    Result Date: 2/26/2024  Interpreted By:  Sebastián Shahid, STUDY: MR BRAIN WO IV CONTRAST; MR ANGIO HEAD WO IV CONTRAST; MR ANGIO NECK WO IV CONTRAST   INDICATION: Signs/Symptoms:stroke, expressive aphasia; Signs/Symptoms:expressive aphasia   COMPARISON:   Head CT performed 02/25/2024   ACCESSION NUMBER(S): [id number]; [id number]; [id number]   ORDERING CLINICIAN: HUBERT RHOADES   TECHNIQUE: Multi-planar multi-sequential MR imaging of the brain was  performed without intravenous contrast. MRA of the head using time-of-flight technique was performed without intravenous contrast. MRA of the neck using time-of-flight technique was performed without intravenous contrast.   FINDINGS: MRI BRAIN:   No acute infarction, intracranial hemorrhage or mass lesion.   No hydrocephalus.  No extra-axial fluid collections. The skull base flow voids are present.   The visualized intraorbital contents are normal. The imaged portions of the paranasal sinuses are clear. The mastoid air cells are clear. The visualized osseous structures, soft tissues and partially visualized parotid glands appear normal. Rounded FLAIR hyperintense lesion within the left parasagittal aspect of the clivus (series 8, image 7) likely represents hemangioma.   MRA NECK:   Standard configuration of the aortic arch with no gross ostial stenosis of the great vessels.   The common carotid arteries are patent bilaterally without evidence of stenosis. There is no narrowing of the cervical internal carotid arteries bilaterally.   The vertebral arteries are patent bilaterally throughout their course.   MRA HEAD:   Normal distal internal carotid arteries.   Moderate focal stenosis of the proximal right A2 segment (series 5, image 29); there is immediate reconstitution. Remainder of the visualized right MONIQUE is normal. Left MONIQUE as well as the bilateral MCAs and PCAs are normal. Fetal origin of the left PCA.   Normal vertebrobasilar system.   No evidence of vascular stenosis, occlusion, aneurysm or vascular malformation.       No acute intracranial abnormality.   Moderate focal stenosis of the proximal right A2 segment of the MONIQUE with immediate reconstitution. Otherwise, cervical and intracranial vasculature is within normal limits.   _____________ NASCET criteria for internal carotid artery stenosis: Mild: 0% to 49% Moderate: 50% to 69% Severe: 70% to 99% Complete Occlusion   Signed by: Sebastián Shahid 2/26/2024 8:56 AM  Dictation workstation:   [id number]    CT brain attack head wo IV contrast    Result Date: 2/25/2024  Interpreted By:  Cleo Benjamin, STUDY: CT BRAIN ATTACK HEAD WO IV CONTRAST;  2/25/2024 12:13 am   INDICATION: Signs/Symptoms:stroke alert.   COMPARISON: None.   ACCESSION NUMBER(S): [id number]   ORDERING CLINICIAN: NASIM GRUBBS   TECHNIQUE: Noncontrast CT axial images were obtained through the brain.  Coronal and sagittal reformats were performed.   FINDINGS: The ventricles, sulci and basal cisterns are within normal limits. The grey-white matter differentiation is intact. No acute territorial infarct.  There is no mass effect or midline shift. There is no extraaxial fluid collection.  There is no intracranial hemorrhage. No depressed calvarial fracture. No air-fluid levels at the visualized paranasal sinuses. The mastoid air cells are clear.       1.  No acute intracranial hemorrhage or acute territorial infarct.         MACRO: Cleo Benjamin discussed the significance and urgency of this critical finding by telephone with  Dr. Hobbs on 2/25/2024 at 12:27 am.  (**-RCF-**) Findings:  See findings.   Signed by: Cleo Benjamin 2/25/2024 12:28 AM Dictation workstation:   [id number]    * Cannot find OR log *  Last relevant procedure:                          Assessment/Plan   Principal Problem:    GI bleed  Active Problems:    Gastrointestinal hemorrhage, unspecified gastrointestinal hemorrhage type    The patient's hemoglobin is steady.  More than likely is stop bleeding.  I stressed to the patient that he should avoid all NSAIDs including the baby aspirin is taking for the time being which is doing it routinely for prevention of heart disease .  The baby aspirin should be held at least for the next 4 weeks so the ulcers have a chance to heal.  You should also make a follow-up appointment with our office for evaluation and a repeat endoscopy done to make sure the esophageal ulcer is healed.  He  should be on a high-dose PPI.  In addition I strongly recommend that he should be having a colonoscopy done given his age and also of first made at losing weight because of concern regarding FREY and diabetes  Will sign off the case for the time being and be back if requested.  This note was created using Dragon dictation technology and unintended errors of omission commission may be present in spite of proofreading         I spent 30minutes in the professional and overall care of this patient.      Nancy Dimas MD

## 2024-02-27 NOTE — PROGRESS NOTES
"Hospital Medicine Daily Progress Note    Patient Name: Carson Art Jr.   YOB: 1975      Subjective:  Carson Art Jr. is a 49 y.o.yo male who is hospital day 2   No acute complaints, states his last BM overnight was brown in color     Objective:  Recent labs, imaging, vital signs and notes from other providers were reviewed     /82 (BP Location: Right arm, Patient Position: Sitting)   Pulse 104   Temp 36.9 °C (98.4 °F) (Temporal)   Resp 18   Ht 1.905 m (6' 3\")   Wt 104 kg (230 lb)   SpO2 99%   BMI 28.75 kg/m²     Physical Exam:     GENERAL: Well developed and in no apparent distress. Alert and cooperative.  HEENT: Normocephalic and atraumatic  CARDIOVASCULAR: Tachycardic  RESPIRATORY: Clear to auscultation b/l. No wheezes, rales or rhonchi. Normal effort.   ABDOMEN: Soft, non-tender. Not distended. No rebound or guarding.  EXTREMITIES: Venous stasis skin changes  NEUROLOGICAL: A&O X 3. No focal deficits.   SKIN: Warm and dry, no lesions, no rashes.  PSYCH: Appropriate mood and affect.        Assessment/Plan:     Patient is a 49-year-old male without any significant medical history who presents with multiple syncopal episodes in the setting of stomach upset and black-colored stools.  His labs showed decreased hemoglobin as well as increased BUN with a normal creatinine.  He was tachycardic. Recently was on a course of NSAIDs.     Upper GI bleed  TIA?  Syncopal episode  Anemia  Lactic acidosis  Non-MI troponin elevation  Obesity     Continue with IV Protonix 40 mg daily to complete 72 hours, followed by 40 mg twice daily p.o. thereafter.  Resume regular diet.  IV Venofer tonight.  Hgb 7.2 this AM pt wishes to stay inpatient until tomorrow AM to repeat hgb. Pt agrees to blood transfusion if necessary risk/benefits discussed in detail with patient    MRIs are negative, I suspect his symptomology is related to the bleeding and anemia.  Discontinue statin and defer on aspirin appreciate " neurology recs   Believe his troponin elevation is secondary to the above issues, no chest pain or concerning EKG findings. Down trended.        Discharge planning:   -follow up with NSG outpatient to establish care for findings of left parasagittal clivus hemangioma   -syncopal event highly suggestive 2/2 acute GIB active bleed volume depletion. 14 day event monitor on dc to rule out any cardiac arrhythmia for completeness     DVT Prophylaxis: SCDs only  Code Status: Full code  Disposition: Anticipate discharge home tomorrow

## 2024-02-28 VITALS
HEART RATE: 98 BPM | RESPIRATION RATE: 16 BRPM | TEMPERATURE: 97.3 F | HEIGHT: 75 IN | WEIGHT: 230 LBS | OXYGEN SATURATION: 97 % | SYSTOLIC BLOOD PRESSURE: 141 MMHG | DIASTOLIC BLOOD PRESSURE: 82 MMHG | BODY MASS INDEX: 28.6 KG/M2

## 2024-02-28 LAB
BLOOD EXPIRATION DATE: NORMAL
BLOOD EXPIRATION DATE: NORMAL
DISPENSE STATUS: NORMAL
DISPENSE STATUS: NORMAL
ERYTHROCYTE [DISTWIDTH] IN BLOOD BY AUTOMATED COUNT: 13 % (ref 11.5–14.5)
HCT VFR BLD AUTO: 21.7 % (ref 41–52)
HGB BLD-MCNC: 7.1 G/DL (ref 13.5–17.5)
HOLD SPECIMEN: NORMAL
MCH RBC QN AUTO: 31.8 PG (ref 26–34)
MCHC RBC AUTO-ENTMCNC: 32.7 G/DL (ref 32–36)
MCV RBC AUTO: 97 FL (ref 80–100)
NRBC BLD-RTO: 0.2 /100 WBCS (ref 0–0)
PLATELET # BLD AUTO: 269 X10*3/UL (ref 150–450)
PRODUCT BLOOD TYPE: 6200
PRODUCT BLOOD TYPE: 6200
PRODUCT CODE: NORMAL
PRODUCT CODE: NORMAL
RBC # BLD AUTO: 2.23 X10*6/UL (ref 4.5–5.9)
UNIT ABO: NORMAL
UNIT ABO: NORMAL
UNIT NUMBER: NORMAL
UNIT NUMBER: NORMAL
UNIT RH: NORMAL
UNIT RH: NORMAL
UNIT VOLUME: 350
UNIT VOLUME: 350
WBC # BLD AUTO: 9 X10*3/UL (ref 4.4–11.3)
XM INTEP: NORMAL
XM INTEP: NORMAL

## 2024-02-28 PROCEDURE — 36415 COLL VENOUS BLD VENIPUNCTURE: CPT | Performed by: INTERNAL MEDICINE

## 2024-02-28 PROCEDURE — 99238 HOSP IP/OBS DSCHRG MGMT 30/<: CPT | Performed by: STUDENT IN AN ORGANIZED HEALTH CARE EDUCATION/TRAINING PROGRAM

## 2024-02-28 PROCEDURE — C9113 INJ PANTOPRAZOLE SODIUM, VIA: HCPCS | Performed by: INTERNAL MEDICINE

## 2024-02-28 PROCEDURE — 85027 COMPLETE CBC AUTOMATED: CPT | Performed by: INTERNAL MEDICINE

## 2024-02-28 PROCEDURE — 94760 N-INVAS EAR/PLS OXIMETRY 1: CPT

## 2024-02-28 PROCEDURE — 2500000004 HC RX 250 GENERAL PHARMACY W/ HCPCS (ALT 636 FOR OP/ED): Performed by: INTERNAL MEDICINE

## 2024-02-28 RX ORDER — PANTOPRAZOLE SODIUM 40 MG/1
40 TABLET, DELAYED RELEASE ORAL EVERY 12 HOURS
Qty: 60 TABLET | Refills: 0 | Status: SHIPPED | OUTPATIENT
Start: 2024-02-28 | End: 2024-05-14

## 2024-02-28 RX ORDER — ATORVASTATIN CALCIUM 80 MG/1
80 TABLET, FILM COATED ORAL DAILY
Qty: 30 TABLET | Refills: 0 | Status: SHIPPED | OUTPATIENT
Start: 2024-02-28 | End: 2024-05-14 | Stop reason: ALTCHOICE

## 2024-02-28 RX ORDER — PANTOPRAZOLE SODIUM 40 MG/1
40 TABLET, DELAYED RELEASE ORAL EVERY 12 HOURS
Status: DISCONTINUED | OUTPATIENT
Start: 2024-02-28 | End: 2024-02-28 | Stop reason: HOSPADM

## 2024-02-28 RX ADMIN — PANTOPRAZOLE SODIUM 40 MG: 40 INJECTION, POWDER, FOR SOLUTION INTRAVENOUS at 09:19

## 2024-02-28 RX ADMIN — IRON SUCROSE 200 MG: 20 INJECTION, SOLUTION INTRAVENOUS at 05:42

## 2024-02-28 ASSESSMENT — PAIN - FUNCTIONAL ASSESSMENT: PAIN_FUNCTIONAL_ASSESSMENT: 0-10

## 2024-02-28 ASSESSMENT — PAIN SCALES - GENERAL: PAINLEVEL_OUTOF10: 0 - NO PAIN

## 2024-02-28 NOTE — HOSPITAL COURSE
49-year-old male presented to [facility] with syncopal episode in the setting of melena.  Patient had an upset stomach on night prior to arrival with several black-colored bowel movements described as black tarry.  Squad was called patient was tachycardic in the emergency room he was recently taking 800 mg of ibuprofen multiple times a day for the last couple weeks for dental pain after dental procedure patient not currently on any blood thinners he was also taking low-dose aspirin.  He states he was given the option by his vascular surgeon to stay on aspirin or not previously and he was advised to take 81 mg of aspirin daily as an option given his reflux however he tells me he was actually taking 325 mg daily because he was able to get that dosage of medication cheaper.  Patient was evaluated by gastroenterology services he underwent EGD and was found to have ulcer in the esophagus as well as ulcer in prepyloric region.  Patient was evaluated by neurology services for his syncopal episode this was likely deemed to be secondary to hypoperfusion in the setting of melena.  Hemoglobin has remained stable and patient has been having brown bowel movements with no more episodes of bleeding he was cleared for discharge by gastroenterology services he will follow-up with his vascular team as well as gastroenterology outpatient no need for neurology follow-up he would like to establish care with Dr. Zhou he will be discharged on cardiac event monitor follow-up with PCP and cardiology as well as gastroenterology.  Patient should avoid all NSAIDs including baby aspirin for time being.  Hold ASA 81 mg for at least 1 month patient should also follow-up with gastroenterology for repeat endoscopy and colonoscopy.  He will be discharged on PPI twice daily until his follow-up with gastroenterology.

## 2024-02-28 NOTE — CARE PLAN
The patient's goals for the shift include  remain safe    The clinical goals for the shift include comfort    Over the shift, the patient did not make progress toward the following goals. Barriers to progression include acute illness. Recommendations to address these barriers include communication.

## 2024-02-28 NOTE — DISCHARGE SUMMARY
Discharge Diagnosis  GI bleed    Issues Requiring Follow-Up  Follow up pcp with cardiac event monitor  Pt would like to establish care with Dr. Zhou cardiology for cardiac event monitor results as well and for any indication of continued asa therapy in the future  Hold any aspirin therapy at this time   Patient advised to stay off ALL NSAIDS  Follow up vascular Dr. Dinh   Pt advised to start atorvastatin 80 mg daily per neurology service  Patient will be discharged on twice daily Protonix until follow-up with gastroenterology    Discharge Meds     Your medication list        CONTINUE taking these medications        Instructions Last Dose Given Next Dose Due   loratadine 10 mg tablet  Commonly known as: Claritin                  ASK your doctor about these medications        Instructions Last Dose Given Next Dose Due   aspirin 325 mg EC tablet                    Test Results Pending At Discharge  Pending Labs       Order Current Status    Surgical Pathology Exam In process            Hospital Course  49-year-old male presented to [facility] with syncopal episode in the setting of melena.  Patient had an upset stomach on night prior to arrival with several black-colored bowel movements described as black tarry.  Squad was called patient was tachycardic in the emergency room he was recently taking 800 mg of ibuprofen multiple times a day for the last couple weeks for dental pain after dental procedure patient not currently on any blood thinners he was also taking low-dose aspirin.  He states he was given the option by his vascular surgeon to stay on aspirin or not previously and he was advised to take 81 mg of aspirin daily as an option given his reflux however he tells me he was actually taking 325 mg daily because he was able to get that dosage of medication cheaper.  Patient was evaluated by gastroenterology services he underwent EGD and was found to have ulcer in the esophagus as well as ulcer in prepyloric  region.  Patient was evaluated by neurology services for his syncopal episode this was likely deemed to be secondary to hypoperfusion in the setting of melena.  Hemoglobin has remained stable and patient has been having brown bowel movements with no more episodes of bleeding he was cleared for discharge by gastroenterology services he will follow-up with his vascular team as well as gastroenterology outpatient no need for neurology follow-up he would like to establish care with Dr. Zhou he will be discharged on cardiac event monitor follow-up with PCP and cardiology as well as gastroenterology.  Patient should avoid all NSAIDs including baby aspirin for time being.  Hold ASA 81 mg for at least 1 month patient should also follow-up with gastroenterology for repeat endoscopy and colonoscopy.  He will be discharged on PPI twice daily until his follow-up with gastroenterology.    Pertinent Physical Exam At Time of Discharge  Physical Exam  Objective:   Physical Exam:  General: Pleasant and cooperative  HEENT: Normocephalic, atraumatic, mucus membranes moist.  Eyes: EOMI  Neck:  Trachea midline.    Chest: Symmetric chest expansion, CTA bilaterally   CV:  Regular rate, regular rhythm.  Positive S1/S2.  Abdomen: soft, non-tender, non-distended, no guarding or peritoneal signs   Extremities:  No lower extremity edema  Neurological:  no obvious focal deficits   Psych: appropriate affect and behavior   Skin:  Warm and dry           Archie Navarro DO

## 2024-02-29 LAB
LABORATORY COMMENT REPORT: NORMAL
PATH REPORT.FINAL DX SPEC: NORMAL
PATH REPORT.GROSS SPEC: NORMAL
PATH REPORT.RELEVANT HX SPEC: NORMAL
PATH REPORT.TOTAL CANCER: NORMAL

## 2024-03-01 ENCOUNTER — PATIENT OUTREACH (OUTPATIENT)
Dept: CARE COORDINATION | Facility: CLINIC | Age: 49
End: 2024-03-01
Payer: COMMERCIAL

## 2024-03-01 NOTE — PROGRESS NOTES
Outreach call to patient to support a smooth transition of care from recent admission.  Left voicemail message for patient with my contact information.  Enrolled patient in [employer] chatbot for additional support and patient education through transition period.  Will continue to monitor through transition period.

## 2024-03-23 LAB — BODY SURFACE AREA: 2.35 M2

## 2024-03-25 PROBLEM — K92.2 GASTROINTESTINAL HEMORRHAGE, UNSPECIFIED GASTROINTESTINAL HEMORRHAGE TYPE: Status: RESOLVED | Noted: 2024-02-25 | Resolved: 2024-03-25

## 2024-03-29 ENCOUNTER — PATIENT OUTREACH (OUTPATIENT)
Dept: CARE COORDINATION | Facility: CLINIC | Age: 49
End: 2024-03-29
Payer: COMMERCIAL

## 2024-04-01 ENCOUNTER — OFFICE VISIT (OUTPATIENT)
Dept: CARDIOLOGY | Facility: CLINIC | Age: 49
End: 2024-04-01
Payer: COMMERCIAL

## 2024-04-01 VITALS
WEIGHT: 315 LBS | HEART RATE: 87 BPM | DIASTOLIC BLOOD PRESSURE: 84 MMHG | SYSTOLIC BLOOD PRESSURE: 160 MMHG | OXYGEN SATURATION: 97 % | BODY MASS INDEX: 39.17 KG/M2 | HEIGHT: 75 IN

## 2024-04-01 DIAGNOSIS — R03.0 ELEVATED BLOOD PRESSURE READING IN OFFICE WITHOUT DIAGNOSIS OF HYPERTENSION: ICD-10-CM

## 2024-04-01 DIAGNOSIS — I87.2 VENOUS INSUFFICIENCY: ICD-10-CM

## 2024-04-01 DIAGNOSIS — K26.4 GASTROINTESTINAL HEMORRHAGE ASSOCIATED WITH DUODENAL ULCER: ICD-10-CM

## 2024-04-01 DIAGNOSIS — R60.0 LEG EDEMA: ICD-10-CM

## 2024-04-01 DIAGNOSIS — E78.49 OTHER HYPERLIPIDEMIA: ICD-10-CM

## 2024-04-01 DIAGNOSIS — R55 SYNCOPE, UNSPECIFIED SYNCOPE TYPE: ICD-10-CM

## 2024-04-01 DIAGNOSIS — D50.8 OTHER IRON DEFICIENCY ANEMIA: Primary | ICD-10-CM

## 2024-04-01 PROBLEM — D50.9 IRON DEFICIENCY ANEMIA: Status: ACTIVE | Noted: 2024-04-01

## 2024-04-01 PROCEDURE — 99203 OFFICE O/P NEW LOW 30 MIN: CPT | Performed by: INTERNAL MEDICINE

## 2024-04-01 PROCEDURE — 1036F TOBACCO NON-USER: CPT | Performed by: INTERNAL MEDICINE

## 2024-04-01 RX ORDER — ACETAMINOPHEN 500 MG/1
TABLET ORAL EVERY 6 HOURS PRN
COMMUNITY
Start: 2024-02-05

## 2024-04-01 ASSESSMENT — ENCOUNTER SYMPTOMS: DYSPNEA ON EXERTION: 1

## 2024-04-01 NOTE — PROGRESS NOTES
"Subjective   Carson Art Jr. is a 49 y.o. male.    Chief Complaint:  Hospital follow-up for syncopal event and gastrointestinal bleeding.  Tachycardia.    HPI:    This 49-year-old gentleman presented to the hospital on February 24, 2024 with multiple syncopal events in the setting of melena.  The patient had multiple syncopal events after noting black and tarry stools.  His hemoglobin ultimately settled around 7.0.  He is referred to us for follow-up.    His cardiac history is otherwise not significant.  Does not carry the diagnosis of hypertension although at times his blood pressures have been elevated.  No history of diabetes or smoking history positive family history of heart disease.    Past medical history: Significant for a vein ablation therapy on his right leg.  Has longstanding venous insufficiency.    Allergies to medications: None known.    Social history: Works as a pharmacist at [employer].  He is a non-smoker and has never smoked.    Family history: Father with the same name has significant coronary disease.    Review of Systems   Constitutional: Positive for malaise/fatigue.   Cardiovascular:  Positive for dyspnea on exertion.   Musculoskeletal:  Positive for arthritis.   Gastrointestinal:  Positive for melena.   All other systems reviewed and are negative.      Visit Vitals  /84 (BP Location: Left arm)   Pulse 87   Ht 1.905 m (6' 3\")   Wt (!) 152 kg (335 lb)   SpO2 97%   BMI 41.87 kg/m²   Smoking Status Former   BSA 2.84 m²        Objective     Constitutional:       Appearance: Not in distress.   Neck:      Vascular: JVD normal.   Pulmonary:      Breath sounds: Normal breath sounds.   Cardiovascular:      Normal rate. Regular rhythm. S1 with normal intensity. S2 with normal intensity.       Murmurs: There is no murmur.      No gallop.    Pulses:     Intact distal pulses.   Edema:     Peripheral edema present.     Pretibial: bilateral 1+ edema of the pretibial area.  Abdominal:    "   General: Bowel sounds are normal.   Neurological:      Mental Status: Alert and oriented to person, place and time.         Lab Review:   Lab Results   Component Value Date     02/25/2024    K 4.2 02/25/2024     (H) 02/25/2024    CO2 22 02/25/2024    BUN 43 (H) 02/25/2024    CREATININE 0.96 02/25/2024    GLUCOSE 126 (H) 02/25/2024    CALCIUM 7.8 (L) 02/25/2024     Lab Results   Component Value Date    WBC 9.0 02/28/2024    HGB 7.1 (L) 02/28/2024    HCT 21.7 (L) 02/28/2024    MCV 97 02/28/2024     02/28/2024     Lab Results   Component Value Date    CHOL 138 02/25/2024    TRIG 158 (H) 02/25/2024    HDL 21.8 02/25/2024       Assessment:    1.  Syncopal events.  Secondary to gastrointestinal bleeding.  Latest hemoglobin was 7, we will repeat his labs.    2.  Elevated blood pressure.  Does not carry the diagnosis of hypertension but blood pressures were significantly elevated today.  However this is our first visit we will continue to monitor.  We have asked him to check his blood pressures at home or at work.    3.  Coronary disease risk factors.  Elevated blood pressure, and strong family history of heart disease.  Will get coronary CT calcium score.    4.  Hyperlipidemia.  Cholesterol 138, HDL 21, LDL 85.  Not clear that he needs statin therapy.    4.  Questional transient ischemic attack.  Carotid studies were negative.    5.  Positive troponins.  Likely secondary to the gastrointestinal bleed and a type II myocardial injury pattern.

## 2024-05-07 ENCOUNTER — HOSPITAL ENCOUNTER (OUTPATIENT)
Dept: RADIOLOGY | Facility: CLINIC | Age: 49
Discharge: HOME | End: 2024-05-07
Payer: COMMERCIAL

## 2024-05-07 DIAGNOSIS — R03.0 ELEVATED BLOOD PRESSURE READING IN OFFICE WITHOUT DIAGNOSIS OF HYPERTENSION: ICD-10-CM

## 2024-05-07 DIAGNOSIS — E78.49 OTHER HYPERLIPIDEMIA: ICD-10-CM

## 2024-05-07 PROCEDURE — 75571 CT HRT W/O DYE W/CA TEST: CPT

## 2024-05-13 PROBLEM — G45.9 TRANSIENT ISCHEMIC ATTACK: Status: ACTIVE | Noted: 2024-05-13

## 2024-05-13 PROBLEM — E78.5 HYPERLIPIDEMIA: Status: ACTIVE | Noted: 2024-04-01

## 2024-05-13 NOTE — PROGRESS NOTES
Subjective     History of Present Illness:   Carson Art Jr. is a 49 y.o. male who presents to GI clinic for follow-up.  He was admitted to the hospital for concern for GI bleeding back in February and had an EGD that showed gastric ulcer and an esophageal ulcer with recommendations to be discharged home on high-dose PPIs and repeat endoscopy to screen for Swanson's and document healing of the gastric ulcer.  .    Has been doing well.  Took pantoprazole 40 mg and finished 2 months of breath and then he ran out of it and he did not have a primary care doctor so he has been taking Nexium over-the-counter.  Feels fine and denies any acid reflux.  Before he had the bleeding he had a lot of NSAIDs because of a tooth being pulled.  He is currently off of aspirin and of NSAIDs.  He is following up with a cardiologist who did a coronary artery calcium score and that was very low risk.      Review of Systems  Review of Systems   Constitutional:  Negative for chills, fever and unexpected weight change.   HENT:  Negative for trouble swallowing.    Respiratory:  Negative for shortness of breath.    Cardiovascular:  Negative for chest pain.   Gastrointestinal:  Negative for abdominal distention, abdominal pain, anal bleeding, blood in stool, constipation, diarrhea, nausea, rectal pain and vomiting.   Skin:  Negative for color change.       Past Medical History   has a past medical history of Patient denies medical problems.     Social History   reports that he has quit smoking. His smoking use included cigars. He has been exposed to tobacco smoke. He has never used smokeless tobacco. He reports that he does not currently use alcohol. He reports that he does not use drugs.     Family History  family history is not on file.     Allergies  No Known Allergies    Medications  Current Outpatient Medications   Medication Instructions    atorvastatin (LIPITOR) 80 mg, oral, Daily    loratadine (CLARITIN) 10 mg, oral, Daily    Pain  Relief ES, acetaminophen, 500 mg tablet Every 6 hours PRN    pantoprazole (PROTONIX) 40 mg, oral, Every 12 hours, Do not crush, chew, or split.       Objective   There were no vitals filed for this visit.  Physical Exam  Vitals reviewed.   Constitutional:       Appearance: Normal appearance.      Comments: Overweight.     Cardiovascular:      Rate and Rhythm: Normal rate and regular rhythm.      Pulses: Normal pulses.   Pulmonary:      Effort: Pulmonary effort is normal.      Breath sounds: Normal breath sounds.   Abdominal:      General: Abdomen is flat. Bowel sounds are normal. There is no distension.      Palpations: Abdomen is soft.      Tenderness: There is no abdominal tenderness.   Musculoskeletal:         General: Normal range of motion.   Neurological:      Mental Status: He is alert.                 Assessment/Plan   Carson Art Jr. is a 49 y.o. male who presents to GI clinic for follow-up of GI bleeding.  He had both an esophageal ulcer and gastric ulcer.  Most likely cause was NSAID induced ulcers however I am not sure if he has underlying Swanson's esophagus or silent reflux causing the esophageal ulcer.  I will do an upper endoscopy to document healing of the ulcer and to screen for Swanson's esophagus.  I will also repeat his CBC with iron studies.    Depending on the results of the endoscopy we will decide if he needs to be on long-term PPIs or not.  I told him in the future of he takes NSAIDs he has to take a PPI for acid protection with it.    Screening for colon cancer.  I recommended a colonoscopy.  I recommend that he get the endoscopy and colonoscopy separate procedures because of risk of anesthesia.  He was schedule endoscopy first and then we can do the colonoscopy.    Patient okay for [place].  .          Ilan Bell MD

## 2024-05-14 ENCOUNTER — OFFICE VISIT (OUTPATIENT)
Dept: GASTROENTEROLOGY | Facility: CLINIC | Age: 49
End: 2024-05-14
Payer: COMMERCIAL

## 2024-05-14 ENCOUNTER — LAB (OUTPATIENT)
Dept: LAB | Facility: LAB | Age: 49
End: 2024-05-14
Payer: COMMERCIAL

## 2024-05-14 VITALS
DIASTOLIC BLOOD PRESSURE: 100 MMHG | HEIGHT: 75 IN | HEART RATE: 80 BPM | WEIGHT: 315 LBS | SYSTOLIC BLOOD PRESSURE: 167 MMHG | BODY MASS INDEX: 39.17 KG/M2

## 2024-05-14 DIAGNOSIS — K25.4 GASTROINTESTINAL HEMORRHAGE ASSOCIATED WITH GASTRIC ULCER: ICD-10-CM

## 2024-05-14 DIAGNOSIS — K25.4 GASTROINTESTINAL HEMORRHAGE ASSOCIATED WITH GASTRIC ULCER: Primary | ICD-10-CM

## 2024-05-14 DIAGNOSIS — D50.0 IRON DEFICIENCY ANEMIA DUE TO CHRONIC BLOOD LOSS: Primary | ICD-10-CM

## 2024-05-14 LAB
ERYTHROCYTE [DISTWIDTH] IN BLOOD BY AUTOMATED COUNT: 15.8 % (ref 11.5–14.5)
FERRITIN SERPL-MCNC: 18 NG/ML (ref 20–300)
HCT VFR BLD AUTO: 36.9 % (ref 41–52)
HGB BLD-MCNC: 11.2 G/DL (ref 13.5–17.5)
IRON SATN MFR SERPL: 6 % (ref 25–45)
IRON SERPL-MCNC: 26 UG/DL (ref 35–150)
MCH RBC QN AUTO: 24 PG (ref 26–34)
MCHC RBC AUTO-ENTMCNC: 30.4 G/DL (ref 32–36)
MCV RBC AUTO: 79 FL (ref 80–100)
NRBC BLD-RTO: 0 /100 WBCS (ref 0–0)
PLATELET # BLD AUTO: 413 X10*3/UL (ref 150–450)
RBC # BLD AUTO: 4.67 X10*6/UL (ref 4.5–5.9)
TIBC SERPL-MCNC: 445 UG/DL (ref 240–445)
UIBC SERPL-MCNC: 419 UG/DL (ref 110–370)
WBC # BLD AUTO: 6.1 X10*3/UL (ref 4.4–11.3)

## 2024-05-14 PROCEDURE — 85027 COMPLETE CBC AUTOMATED: CPT

## 2024-05-14 PROCEDURE — 99214 OFFICE O/P EST MOD 30 MIN: CPT | Performed by: INTERNAL MEDICINE

## 2024-05-14 PROCEDURE — 83550 IRON BINDING TEST: CPT

## 2024-05-14 PROCEDURE — 36415 COLL VENOUS BLD VENIPUNCTURE: CPT

## 2024-05-14 PROCEDURE — 83540 ASSAY OF IRON: CPT

## 2024-05-14 PROCEDURE — 82728 ASSAY OF FERRITIN: CPT

## 2024-05-14 RX ORDER — SODIUM CHLORIDE 9 MG/ML
20 INJECTION, SOLUTION INTRAVENOUS CONTINUOUS
OUTPATIENT
Start: 2024-05-14

## 2024-05-14 ASSESSMENT — ENCOUNTER SYMPTOMS
FEVER: 0
UNEXPECTED WEIGHT CHANGE: 0
TROUBLE SWALLOWING: 0
BLOOD IN STOOL: 0
COLOR CHANGE: 0
ABDOMINAL DISTENTION: 0
ANAL BLEEDING: 0
RECTAL PAIN: 0
SHORTNESS OF BREATH: 0
NAUSEA: 0
CONSTIPATION: 0
CHILLS: 0
DIARRHEA: 0
VOMITING: 0
ABDOMINAL PAIN: 0

## 2024-05-17 RX ORDER — FERROUS SULFATE 325(65) MG
650 TABLET, DELAYED RELEASE (ENTERIC COATED) ORAL
Qty: 60 TABLET | Refills: 1 | Status: SHIPPED | OUTPATIENT
Start: 2024-05-17 | End: 2024-07-16

## 2024-05-30 ENCOUNTER — OFFICE VISIT (OUTPATIENT)
Dept: CARDIOLOGY | Facility: CLINIC | Age: 49
End: 2024-05-30
Payer: COMMERCIAL

## 2024-05-30 VITALS
DIASTOLIC BLOOD PRESSURE: 100 MMHG | OXYGEN SATURATION: 97 % | SYSTOLIC BLOOD PRESSURE: 150 MMHG | WEIGHT: 315 LBS | HEART RATE: 85 BPM | HEIGHT: 75 IN | BODY MASS INDEX: 39.17 KG/M2

## 2024-05-30 DIAGNOSIS — I87.2 VENOUS INSUFFICIENCY: ICD-10-CM

## 2024-05-30 DIAGNOSIS — R60.0 LEG EDEMA: ICD-10-CM

## 2024-05-30 DIAGNOSIS — E78.5 HYPERLIPIDEMIA, UNSPECIFIED HYPERLIPIDEMIA TYPE: Primary | ICD-10-CM

## 2024-05-30 DIAGNOSIS — I25.10 CORONARY ARTERY DISEASE INVOLVING NATIVE CORONARY ARTERY OF NATIVE HEART WITHOUT ANGINA PECTORIS: ICD-10-CM

## 2024-05-30 DIAGNOSIS — I10 PRIMARY HYPERTENSION: ICD-10-CM

## 2024-05-30 PROBLEM — R03.0 ELEVATED BLOOD PRESSURE READING IN OFFICE WITHOUT DIAGNOSIS OF HYPERTENSION: Status: RESOLVED | Noted: 2024-04-01 | Resolved: 2024-05-30

## 2024-05-30 PROCEDURE — 3080F DIAST BP >= 90 MM HG: CPT | Performed by: INTERNAL MEDICINE

## 2024-05-30 PROCEDURE — 3077F SYST BP >= 140 MM HG: CPT | Performed by: INTERNAL MEDICINE

## 2024-05-30 PROCEDURE — 99214 OFFICE O/P EST MOD 30 MIN: CPT | Performed by: INTERNAL MEDICINE

## 2024-05-30 PROCEDURE — 1036F TOBACCO NON-USER: CPT | Performed by: INTERNAL MEDICINE

## 2024-05-30 RX ORDER — ATORVASTATIN CALCIUM 40 MG/1
40 TABLET, FILM COATED ORAL DAILY
Qty: 90 TABLET | Refills: 3 | Status: SHIPPED | OUTPATIENT
Start: 2024-05-30 | End: 2025-05-30

## 2024-05-30 RX ORDER — LOSARTAN POTASSIUM 100 MG/1
100 TABLET ORAL DAILY
Qty: 90 TABLET | Refills: 3 | Status: SHIPPED | OUTPATIENT
Start: 2024-05-30 | End: 2025-05-30

## 2024-05-30 NOTE — PATIENT INSTRUCTIONS
Take losartan 100 mg daily for blood pressure.    Start atorvastatin 40 mg daily for cholesterol

## 2024-05-30 NOTE — PROGRESS NOTES
Subjective   Carson Art Jr. is a 49 y.o. male.    Chief Complaint:  Cardiac evaluation follow-up for hypertension, coronary disease risk factors.    HPI    Since his last visit he has done well.  He is trying to exercise and lose weight.  No further syncopal events.  No evidence of gastrointestinal bleeding.    He presented to the hospital on 3/24/2024 with multiple syncopal events in the setting of melena.  His hemoglobin settled around 7.0.  He was found to have evidence of gastrointestinal bleeding.  Followed by the gastroenterology service.    His cardiac history is otherwise not significant.  Does not carry the diagnosis of hypertension although at times his blood pressures have been elevated.  No history of diabetes or smoking history positive family history of heart disease.     Past medical history: Significant for a vein ablation therapy on his right leg.  Has longstanding venous insufficiency.     Allergies to medications: None known.     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  He is a non-smoker and has never smoked.     Family history: Father with the same name has significant coronary disease.     Review of Systems   Constitutional: Positive for malaise/fatigue.   Cardiovascular:  Positive for dyspnea on exertion.   Musculoskeletal:  Positive for arthritis.   All other systems reviewed and are negative.      Current Outpatient Medications   Medication Sig Dispense Refill    ferrous sulfate 325 (65 Fe) MG EC tablet Take 2 tablets by mouth once daily with breakfast. Do not crush, chew, or split. 60 tablet 1    loratadine (Claritin) 10 mg tablet Take 1 tablet (10 mg) by mouth once daily.      Pain Relief ES, acetaminophen, 500 mg tablet every 6 hours if needed.      atorvastatin (Lipitor) 40 mg tablet Take 1 tablet (40 mg) by mouth once daily. 90 tablet 3    losartan (Cozaar) 100 mg tablet Take 1 tablet (100 mg) by mouth once daily. 90 tablet 3    pantoprazole (ProtoNix) 40 mg  "EC tablet Take 1 tablet (40 mg) by mouth every 12 hours for 60 doses. Do not crush, chew, or split. 60 tablet 0     No current facility-administered medications for this visit.        Visit Vitals  BP (!) 150/100 (BP Location: Left arm)   Pulse 85   Ht 1.905 m (6' 3\")   Wt (!) 155 kg (342 lb)   SpO2 97%   BMI 42.75 kg/m²   Smoking Status Former   BSA 2.86 m²        Objective     Constitutional:       Appearance: Not in distress.   Neck:      Vascular: JVD normal.   Pulmonary:      Breath sounds: Normal breath sounds.   Cardiovascular:      Normal rate. Regular rhythm. S1 with normal intensity. S2 with normal intensity.       Murmurs: There is no murmur.      No gallop.    Pulses:     Intact distal pulses.   Edema:     Pretibial: bilateral 1+ edema of the pretibial area.     Ankle: bilateral 1+ edema of the ankle.  Abdominal:      General: Bowel sounds are normal.   Neurological:      Mental Status: Alert and oriented to person, place and time.         Lab Review:   Lab Results   Component Value Date     02/25/2024    K 4.2 02/25/2024     (H) 02/25/2024    CO2 22 02/25/2024    BUN 43 (H) 02/25/2024    CREATININE 0.96 02/25/2024    GLUCOSE 126 (H) 02/25/2024    CALCIUM 7.8 (L) 02/25/2024     Lab Results   Component Value Date    CHOL 138 02/25/2024    TRIG 158 (H) 02/25/2024    HDL 21.8 02/25/2024       Assessment:    1.  Coronary artery disease.  Based on a positive CT calcium score of 24.  We personally reviewed the CT scan.  There is focal areas of atherosclerosis in the proximal left anterior descending coronary artery.  For his age this is a high score.    2.  Hypertension.  Blood pressures were consistently elevated on today's visit.  Will start losartan 100 mg daily.    3.  Hyperlipidemia.  Will start statin therapy with atorvastatin 40 mg daily.  Did discuss with him the potential side effects to statin therapy.  "

## 2024-05-31 ENCOUNTER — ANESTHESIA EVENT (OUTPATIENT)
Dept: GASTROENTEROLOGY | Facility: EXTERNAL LOCATION | Age: 49
End: 2024-05-31

## 2024-06-14 ENCOUNTER — ANESTHESIA (OUTPATIENT)
Dept: GASTROENTEROLOGY | Facility: EXTERNAL LOCATION | Age: 49
End: 2024-06-14

## 2024-06-14 ENCOUNTER — APPOINTMENT (OUTPATIENT)
Dept: GASTROENTEROLOGY | Facility: EXTERNAL LOCATION | Age: 49
End: 2024-06-14
Payer: COMMERCIAL

## 2024-06-14 VITALS
SYSTOLIC BLOOD PRESSURE: 135 MMHG | RESPIRATION RATE: 16 BRPM | BODY MASS INDEX: 39.17 KG/M2 | TEMPERATURE: 97.9 F | DIASTOLIC BLOOD PRESSURE: 83 MMHG | OXYGEN SATURATION: 99 % | WEIGHT: 315 LBS | HEART RATE: 80 BPM | HEIGHT: 75 IN

## 2024-06-14 DIAGNOSIS — K22.10 ULCER OF ESOPHAGUS WITHOUT BLEEDING: ICD-10-CM

## 2024-06-14 DIAGNOSIS — K25.4 GASTROINTESTINAL HEMORRHAGE ASSOCIATED WITH GASTRIC ULCER: Primary | ICD-10-CM

## 2024-06-14 PROCEDURE — 43239 EGD BIOPSY SINGLE/MULTIPLE: CPT | Performed by: INTERNAL MEDICINE

## 2024-06-14 RX ORDER — SODIUM CHLORIDE 9 MG/ML
20 INJECTION, SOLUTION INTRAVENOUS CONTINUOUS
Status: DISCONTINUED | OUTPATIENT
Start: 2024-06-14 | End: 2024-06-15 | Stop reason: HOSPADM

## 2024-06-14 RX ORDER — OMEPRAZOLE 40 MG/1
40 CAPSULE, DELAYED RELEASE ORAL
Qty: 60 CAPSULE | Refills: 2 | Status: SHIPPED | OUTPATIENT
Start: 2024-06-14 | End: 2024-09-12

## 2024-06-14 RX ORDER — LIDOCAINE HYDROCHLORIDE 20 MG/ML
INJECTION, SOLUTION INFILTRATION; PERINEURAL AS NEEDED
Status: DISCONTINUED | OUTPATIENT
Start: 2024-06-14 | End: 2024-06-14

## 2024-06-14 RX ORDER — PROPOFOL 10 MG/ML
INJECTION, EMULSION INTRAVENOUS AS NEEDED
Status: DISCONTINUED | OUTPATIENT
Start: 2024-06-14 | End: 2024-06-14

## 2024-06-14 SDOH — HEALTH STABILITY: MENTAL HEALTH: CURRENT SMOKER: 0

## 2024-06-14 ASSESSMENT — PAIN - FUNCTIONAL ASSESSMENT
PAIN_FUNCTIONAL_ASSESSMENT: 0-10

## 2024-06-14 ASSESSMENT — PAIN SCALES - GENERAL
PAINLEVEL_OUTOF10: 0 - NO PAIN
PAIN_LEVEL: 0

## 2024-06-14 ASSESSMENT — COLUMBIA-SUICIDE SEVERITY RATING SCALE - C-SSRS
6. HAVE YOU EVER DONE ANYTHING, STARTED TO DO ANYTHING, OR PREPARED TO DO ANYTHING TO END YOUR LIFE?: NO
1. IN THE PAST MONTH, HAVE YOU WISHED YOU WERE DEAD OR WISHED YOU COULD GO TO SLEEP AND NOT WAKE UP?: NO
6. HAVE YOU EVER DONE ANYTHING, STARTED TO DO ANYTHING, OR PREPARED TO DO ANYTHING TO END YOUR LIFE?: NO
2. HAVE YOU ACTUALLY HAD ANY THOUGHTS OF KILLING YOURSELF?: NO
1. IN THE PAST MONTH, HAVE YOU WISHED YOU WERE DEAD OR WISHED YOU COULD GO TO SLEEP AND NOT WAKE UP?: NO
2. HAVE YOU ACTUALLY HAD ANY THOUGHTS OF KILLING YOURSELF?: NO

## 2024-06-14 NOTE — H&P
Procedure H&P    Patient Profile-Procedures  Name Carson Art Jr.  Date of Birth 1975  MRN [id number]  Address   [address] [id number] [address] [facility] [place] [address]    Primary Phone Number [phone]  Secondary Phone Number    Thony Bañuelos    Procedure(s):  Procedures: EGD  Primary contact name and number   Extended Emergency Contact Information  Primary Emergency Contact: Carson Art  Home Phone: [phone]  Relation: Parent    General Health  Weight   Vitals:    06/14/24 1222   Weight: (!) 155 kg (342 lb)     BMI Body mass index is 42.75 kg/m².    Allergies  Allergies   Allergen Reactions    Nsaids (Non-Steroidal Anti-Inflammatory Drug) GI bleeding       Past Medical History   Past Medical History:   Diagnosis Date    Hyperlipidemia     Hypertension     Patient denies medical problems     Seasonal allergies        Provider assessment  Diagnosis:  follow-up of esophageal ulcer   Medication Reviewed - yes  Prior to Admission medications    Medication Sig Start Date End Date Taking? Authorizing Provider   atorvastatin (Lipitor) 40 mg tablet Take 1 tablet (40 mg) by mouth once daily. 5/30/24 5/30/25 Yes Rick Zhou MD   ferrous sulfate 325 (65 Fe) MG EC tablet Take 2 tablets by mouth once daily with breakfast. Do not crush, chew, or split. 5/17/24 7/16/24 Yes Ilan Bell MD   loratadine (Claritin) 10 mg tablet Take 1 tablet (10 mg) by mouth once daily.   Yes Historical Provider, MD   losartan (Cozaar) 100 mg tablet Take 1 tablet (100 mg) by mouth once daily. 5/30/24 5/30/25 Yes Rick Zhou MD   Pain Relief ES, acetaminophen, 500 mg tablet every 6 hours if needed. 2/5/24  Yes Historical Provider, MD   pantoprazole (ProtoNix) 40 mg EC tablet Take 1 tablet (40 mg) by mouth every 12 hours for 60 doses. Do not crush, chew, or split. 2/28/24 5/14/24  Archie Navarro, DO       Physical Exam  Vitals:    06/14/24 1222   BP: (!) 141/95   Pulse: 71   Resp: 14   Temp: 36.8 °C  (98.2 °F)   SpO2: 97%        General: A&Ox3, NAD.  HEENT: AT/NC.   CV: RRR. No murmur.  Resp: CTA bilaterally. No wheezing, rhonchi or rales.   GI: Soft, NT/ND. BSx4.  Extrem: No edema. Pulses intact.  Neuro: No focal deficits.   Psych: Normal mood and affect.      Procedure Plan - pre-procedural (re)assesment completed by physician:  discharge/transfer patient when discharge criteria met    ASA status 3  Mallampati score 3    Ilan Bell MD  6/14/2024 12:45 PM

## 2024-06-14 NOTE — DISCHARGE INSTRUCTIONS
Patient Instructions Post Procedure      The anesthetics, sedatives or narcotics which were given to you today will be acting in your body for the next 24 hours, so you might feel a little sleepy or groggy.  This feeling should slowly wear off. Carefully read and follow the instructions.     You received sedation today:  - Do not drive or operate any machinery or power tools of any kind.   - No alcoholic beverages today, not even beer or wine.  - Do not make any important decisions or sign any legal documents.  - No over the counter medications that contain alcohol or that may cause drowsiness.    While it is common to experience mild to moderate abdominal distention, gas, or belching after your procedure, if any of these symptoms occur following discharge from the GI Lab or within one week of having your procedure, call the [facility] to be advised whether a visit to your nearest Urgent Care or Emergency Department is indicated.  Take this paper with you if you go.   - If you develop an allergic reaction to the medications that were given during your procedure such as difficulty breathing, rash, hives, severe nausea, vomiting or lightheadedness.  - If you experience chest pain, shortness of breath, severe abdominal pain, fevers and chills.  -If you develop signs and symptoms of bleeding such as blood in your spit, if your stools turn black, tarry, or bloody  - If you have not urinated within 8 hours following your procedure.  - If your IV site becomes painful, red, inflamed, or looks infected.    Your physician recommends the additional following instructions:    -You have a contact number available for emergencies. The signs and symptoms of potential delayed complications were discussed with you. You may return to normal activities tomorrow.  -Resume your previous diet or other if specified.  -Continue your present medications.   -We are waiting for your pathology results, if applicable.  -The  findings and recommendations have been discussed with you and/or family.  - Please see Medication Reconciliation Form for new medication/medications prescribed.     If you experience any problems or have any questions following discharge from the GI Lab, please call: [phone] from 7 am- 4:30 pm.  In the event of an emergency please go to the closest Emergency Department or call Dr. Bell after hours [phone]

## 2024-06-14 NOTE — ANESTHESIA PREPROCEDURE EVALUATION
Patient: Carson Art Jr.    Procedure Information       Date/Time: 06/14/24 1320    Scheduled providers: Ilan Bell MD    Procedure: EGD    Location: [facility]            Relevant Problems   Cardiac   (+) Coronary artery disease involving native coronary artery of native heart without angina pectoris   (+) Hyperlipidemia   (+) Primary hypertension      GI   (+) GI bleed      Hematology   (+) Iron deficiency anemia       Clinical information reviewed:   Tobacco  Allergies  Meds   Med Hx  Surg Hx   Fam Hx  Soc Hx        NPO Detail:  NPO/Void Status  Date of Last Liquid: 06/14/24  Time of Last Liquid: 0015  Date of Last Solid: 06/13/24  Time of Last Solid: 1330  Last Intake Type: Clear fluids         Physical Exam    Airway  Mallampati: III  TM distance: >3 FB  Neck ROM: full     Cardiovascular - normal exam     Dental - normal exam     Pulmonary - normal exam  Breath sounds clear to auscultation     Abdominal            Anesthesia Plan    History of general anesthesia?: yes  History of complications of general anesthesia?: no    ASA 3     MAC     The patient is not a current smoker.    intravenous induction   Anesthetic plan and risks discussed with patient.    Plan discussed with CRNA.

## 2024-06-14 NOTE — ANESTHESIA POSTPROCEDURE EVALUATION
Patient: Carson Art Jr.    Procedure Summary       Date: 06/14/24 Room / Location: [facility]    Anesthesia Start: 1250 Anesthesia Stop: 1311    Procedure: EGD Diagnosis:       Gastrointestinal hemorrhage associated with gastric ulcer      Gastrointestinal hemorrhage associated with gastric ulcer    Scheduled Providers: Ilan Bell MD Responsible Provider: DOROTHEA Wilson    Anesthesia Type: MAC ASA Status: 3            Anesthesia Type: MAC    Vitals Value Taken Time   /76 06/14/24 1308   Temp 36.6 °C (97.9 °F) 06/14/24 1308   Pulse 78 06/14/24 1308   Resp 16 06/14/24 1308   SpO2 99 % 06/14/24 1308       Anesthesia Post Evaluation    Patient location during evaluation: bedside  Patient participation: complete - patient cannot participate  Level of consciousness: awake and responsive to verbal stimuli  Pain score: 0  Pain management: adequate  Airway patency: patent  Cardiovascular status: acceptable and hemodynamically stable  Respiratory status: acceptable  Hydration status: acceptable  Postoperative Nausea and Vomiting: none        No notable events documented.

## 2024-06-27 LAB
CLINICAL SIG UPDATED INFO: NORMAL
LABORATORY COMMENT REPORT: NORMAL
PATH REPORT.FINAL DX SPEC: NORMAL
PATH REPORT.GROSS SPEC: NORMAL
PATH REPORT.TOTAL CANCER: NORMAL

## 2024-06-28 ENCOUNTER — TELEPHONE (OUTPATIENT)
Dept: GASTROENTEROLOGY | Facility: CLINIC | Age: 49
End: 2024-06-28
Payer: COMMERCIAL

## 2024-06-28 NOTE — TELEPHONE ENCOUNTER
----- Message from Ilan Bell MD sent at 6/28/2024  7:58 AM EDT -----  Can you let him know his biopsies are ok but because of ulcer he needs to schedule a repeat EGD in 2-3 months (please schedule in NR) and continue on high dose Omeprazole 40 mg BID

## 2024-10-03 ENCOUNTER — ANESTHESIA EVENT (OUTPATIENT)
Dept: GASTROENTEROLOGY | Facility: EXTERNAL LOCATION | Age: 49
End: 2024-10-03

## 2024-10-11 ENCOUNTER — APPOINTMENT (OUTPATIENT)
Dept: GASTROENTEROLOGY | Facility: EXTERNAL LOCATION | Age: 49
End: 2024-10-11
Payer: COMMERCIAL

## 2024-10-11 ENCOUNTER — ANESTHESIA (OUTPATIENT)
Dept: GASTROENTEROLOGY | Facility: EXTERNAL LOCATION | Age: 49
End: 2024-10-11

## 2024-10-11 VITALS
DIASTOLIC BLOOD PRESSURE: 105 MMHG | RESPIRATION RATE: 14 BRPM | TEMPERATURE: 97.5 F | WEIGHT: 315 LBS | SYSTOLIC BLOOD PRESSURE: 160 MMHG | HEART RATE: 68 BPM | HEIGHT: 75 IN | BODY MASS INDEX: 39.17 KG/M2 | OXYGEN SATURATION: 98 %

## 2024-10-11 DIAGNOSIS — K21.00 GASTROESOPHAGEAL REFLUX DISEASE WITH ESOPHAGITIS, UNSPECIFIED WHETHER HEMORRHAGE: Primary | ICD-10-CM

## 2024-10-11 PROCEDURE — 43239 EGD BIOPSY SINGLE/MULTIPLE: CPT | Performed by: INTERNAL MEDICINE

## 2024-10-11 RX ORDER — LIDOCAINE HYDROCHLORIDE 20 MG/ML
INJECTION, SOLUTION INFILTRATION; PERINEURAL AS NEEDED
Status: DISCONTINUED | OUTPATIENT
Start: 2024-10-11 | End: 2024-10-11

## 2024-10-11 RX ORDER — PROPOFOL 10 MG/ML
INJECTION, EMULSION INTRAVENOUS AS NEEDED
Status: DISCONTINUED | OUTPATIENT
Start: 2024-10-11 | End: 2024-10-11

## 2024-10-11 RX ORDER — SODIUM CHLORIDE 9 MG/ML
INJECTION, SOLUTION INTRAVENOUS CONTINUOUS PRN
Status: DISCONTINUED | OUTPATIENT
Start: 2024-10-11 | End: 2024-10-11

## 2024-10-11 SDOH — HEALTH STABILITY: MENTAL HEALTH: CURRENT SMOKER: 1

## 2024-10-11 ASSESSMENT — PAIN SCALES - GENERAL
PAINLEVEL_OUTOF10: 0 - NO PAIN
PAIN_LEVEL: 0
PAINLEVEL_OUTOF10: 0 - NO PAIN
PAINLEVEL_OUTOF10: 0 - NO PAIN

## 2024-10-11 ASSESSMENT — COLUMBIA-SUICIDE SEVERITY RATING SCALE - C-SSRS
1. IN THE PAST MONTH, HAVE YOU WISHED YOU WERE DEAD OR WISHED YOU COULD GO TO SLEEP AND NOT WAKE UP?: NO
6. HAVE YOU EVER DONE ANYTHING, STARTED TO DO ANYTHING, OR PREPARED TO DO ANYTHING TO END YOUR LIFE?: NO
2. HAVE YOU ACTUALLY HAD ANY THOUGHTS OF KILLING YOURSELF?: NO

## 2024-10-11 ASSESSMENT — PAIN - FUNCTIONAL ASSESSMENT
PAIN_FUNCTIONAL_ASSESSMENT: 0-10

## 2024-10-11 NOTE — ANESTHESIA PREPROCEDURE EVALUATION
Patient: Carson Art Jr.    Procedure Information       Date/Time: 10/11/24 0920    Scheduled providers: Ilan Bell MD    Procedure: EGD    Location: [facility]            Relevant Problems   Anesthesia (within normal limits)      Cardiac   (+) Coronary artery disease involving native coronary artery of native heart without angina pectoris   (+) Hyperlipidemia   (+) Primary hypertension      Pulmonary (within normal limits)      Neuro (within normal limits)   (+) Transient ischemic attack      GI   (+) GI bleed      /Renal (within normal limits)      Liver (within normal limits)      Endocrine (within normal limits)      Hematology   (+) Iron deficiency anemia      Musculoskeletal (within normal limits)      HEENT (within normal limits)      ID (within normal limits)      Skin (within normal limits)      GYN (within normal limits)       Clinical information reviewed:   Tobacco  Allergies  Meds   Med Hx  Surg Hx   Fam Hx  Soc Hx        NPO Detail:  NPO/Void Status  Carbohydrate Drink Given Prior to Surgery? : N  Date of Last Liquid: 10/10/24  Time of Last Liquid: 2300  Date of Last Solid: 10/10/24  Time of Last Solid: 2300  Last Intake Type: Clear fluids  Time of Last Void: 0839         Physical Exam    Airway  Mallampati: II  TM distance: >3 FB  Neck ROM: full     Cardiovascular   Rhythm: regular  Rate: normal     Dental    Pulmonary   Breath sounds clear to auscultation     Abdominal   Abdomen: soft  Bowel sounds: normal           Anesthesia Plan    History of general anesthesia?: yes  History of complications of general anesthesia?: no    ASA 3     MAC     The patient is a current smoker.  Patient was previously instructed to abstain from smoking on day of procedure.  Patient did not smoke on day of procedure.  Education provided regarding risk of obstructive sleep apnea.  intravenous induction   Anesthetic plan and risks discussed with patient.    Plan discussed with CRNA.

## 2024-10-11 NOTE — DISCHARGE INSTRUCTIONS
Patient Instructions Post Procedure   The anesthetics, sedatives or narcotics which were given to you today will be acting in your body for the next 24 hours, so you might feel a little sleepy or groggy.  This feeling should slowly wear off. Carefully read and follow the instructions.     You received sedation today:  - Do not drive or operate any machinery or power tools of any kind.   - No alcoholic beverages today, not even beer or wine.  - Do not make any important decisions or sign any legal documents.  - No over the counter medications that contain alcohol or that may cause drowsiness.    While it is common to experience mild to moderate abdominal distention, gas, or belching after your procedure, if any of these symptoms occur following discharge from the GI Lab or within one week of having your procedure, call the [facility] to be advised whether a visit to your nearest Urgent Care or Emergency Department is indicated.  Take this paper with you if you go.   - If you develop an allergic reaction to the medications that were given during your procedure such as difficulty breathing, rash, hives, severe nausea, vomiting or lightheadedness.  - If you experience chest pain, shortness of breath, severe abdominal pain, fevers and chills.  -If you develop signs and symptoms of bleeding such as blood in your spit, if your stools turn black, tarry, or bloody  - If you have not urinated within 8 hours following your procedure.  - If your IV site becomes painful, red, inflamed, or looks infected.    Your physician recommends the additional following instructions:    -You have a contact number available for emergencies. The signs and symptoms of potential delayed complications were discussed with you. You may return to normal activities tomorrow.  -Resume your previous diet or other if specified.  -Continue your present medications.   -We are waiting for your pathology results, if applicable.  -The  "findings and recommendations have been discussed with you and/or family.  - Please see Medication Reconciliation Form for new medication/medications prescribed.     If you experience any problems or have any questions following discharge from the GI Lab, please call: [phone] from 7 am- 4:30 pm.  In the event of an emergency please go to the closest Emergency Department or call Dr. Bell: [phone]  You will receive a follow up text message tomorrow morning, if you are in need of a call back to address a non urgent question please respond with \"YES\", and we will call you the following business day Monday through Friday. If you do not need to a call back please respond with the word \"NO\" and we will remove you from the call back list.     "

## 2024-10-11 NOTE — H&P
Procedure H&P    Patient Profile-Procedures  Name Crason Art Jr.  Date of Birth 1975  MRN [id number]  Address   [address] [facility] [id number] [address] [facility] [address]    Primary Phone Number [phone]  Secondary Phone Number    Thony Bañuelos    Procedure(s):  Procedures: EGD  Primary contact name and number   Extended Emergency Contact Information  Primary Emergency Contact: Carson Art  Home Phone: [phone]  Relation: Parent    General Health  Weight   Vitals:    10/11/24 0837   Weight: 147 kg (325 lb)     BMI Body mass index is 40.62 kg/m².    Allergies  Allergies   Allergen Reactions    Nsaids (Non-Steroidal Anti-Inflammatory Drug) GI bleeding       Past Medical History   Past Medical History:   Diagnosis Date    Hyperlipidemia     Hypertension     Patient denies medical problems     Seasonal allergies        Provider assessment  Diagnosis: GERD  Medication Reviewed - yes  Prior to Admission medications    Medication Sig Start Date End Date Taking? Authorizing Provider   atorvastatin (Lipitor) 40 mg tablet Take 1 tablet (40 mg) by mouth once daily. 5/30/24 5/30/25 Yes Rick Zhou MD   loratadine (Claritin) 10 mg tablet Take 1 tablet (10 mg) by mouth once daily.   Yes Historical Provider, MD   losartan (Cozaar) 100 mg tablet Take 1 tablet (100 mg) by mouth once daily. 5/30/24 5/30/25 Yes Rick Zhou MD   Pain Relief ES, acetaminophen, 500 mg tablet every 6 hours if needed. 2/5/24  Yes Historical Provider, MD   omeprazole (PriLOSEC) 40 mg DR capsule Take 1 capsule (40 mg) by mouth 2 times a day before meals. Do not crush or chew. 6/14/24 9/12/24  Ilan Bell MD       Physical Exam  Vitals:    10/11/24 0837   BP: 151/85   Pulse: 72   Resp: 13   Temp: 36.2 °C (97.2 °F)   SpO2: 97%        General: A&Ox3, NAD.  HEENT: AT/NC.   CV: RRR. No murmur.  Resp: CTA bilaterally. No wheezing, rhonchi or rales.   GI: Soft, NT/ND. BSx4.  Extrem: No edema. Pulses  intact.  Neuro: No focal deficits.   Psych: Normal mood and affect.      Procedure Plan - pre-procedural (re)assesment completed by physician:  discharge/transfer patient when discharge criteria met    ASA status 3  Mallampati score 3    Ilan Bell MD  10/11/2024 9:32 AM

## 2024-10-11 NOTE — ANESTHESIA POSTPROCEDURE EVALUATION
Patient: Carson Art Jr.    Procedure Summary       Date: 10/11/24 Room / Location: [facility]    Anesthesia Start: 0930 Anesthesia Stop: 0945    Procedure: EGD Diagnosis:       Gastroesophageal reflux disease with esophagitis, unspecified whether hemorrhage      Gastroesophageal reflux disease with esophagitis, unspecified whether hemorrhage    Scheduled Providers: Ilan Bell MD Responsible Provider: DOROTHEA Londono    Anesthesia Type: MAC ASA Status: 3            Anesthesia Type: MAC    Vitals Value Taken Time   /106 10/11/24 0943   Temp 36.4 °C (97.5 °F) 10/11/24 0943   Pulse 74 10/11/24 0943   Resp 14 10/11/24 0943   SpO2 94 % 10/11/24 0943       Anesthesia Post Evaluation    Patient location during evaluation: bedside  Patient participation: complete - patient participated  Level of consciousness: sleepy but conscious  Pain score: 0  Pain management: adequate  Airway patency: patent  Cardiovascular status: acceptable  Respiratory status: acceptable  Hydration status: acceptable  Postoperative Nausea and Vomiting: none        No notable events documented.

## 2024-10-14 ENCOUNTER — APPOINTMENT (OUTPATIENT)
Dept: PRIMARY CARE | Facility: CLINIC | Age: 49
End: 2024-10-14
Payer: COMMERCIAL

## 2024-10-14 VITALS — DIASTOLIC BLOOD PRESSURE: 90 MMHG | WEIGHT: 315 LBS | SYSTOLIC BLOOD PRESSURE: 142 MMHG | BODY MASS INDEX: 43.37 KG/M2

## 2024-10-14 DIAGNOSIS — I10 PRIMARY HYPERTENSION: ICD-10-CM

## 2024-10-14 DIAGNOSIS — R29.818 SUSPECTED SLEEP APNEA: Primary | ICD-10-CM

## 2024-10-14 DIAGNOSIS — E66.01 SEVERE OBESITY (BMI >= 40) (MULTI): ICD-10-CM

## 2024-10-14 DIAGNOSIS — K26.4 GASTROINTESTINAL HEMORRHAGE ASSOCIATED WITH DUODENAL ULCER: ICD-10-CM

## 2024-10-14 DIAGNOSIS — E78.5 HYPERLIPIDEMIA, UNSPECIFIED HYPERLIPIDEMIA TYPE: ICD-10-CM

## 2024-10-14 DIAGNOSIS — Z00.00 HEALTHCARE MAINTENANCE: ICD-10-CM

## 2024-10-14 PROCEDURE — 90471 IMMUNIZATION ADMIN: CPT | Performed by: STUDENT IN AN ORGANIZED HEALTH CARE EDUCATION/TRAINING PROGRAM

## 2024-10-14 PROCEDURE — 99204 OFFICE O/P NEW MOD 45 MIN: CPT | Performed by: STUDENT IN AN ORGANIZED HEALTH CARE EDUCATION/TRAINING PROGRAM

## 2024-10-14 PROCEDURE — 3077F SYST BP >= 140 MM HG: CPT | Performed by: STUDENT IN AN ORGANIZED HEALTH CARE EDUCATION/TRAINING PROGRAM

## 2024-10-14 PROCEDURE — 90715 TDAP VACCINE 7 YRS/> IM: CPT | Performed by: STUDENT IN AN ORGANIZED HEALTH CARE EDUCATION/TRAINING PROGRAM

## 2024-10-14 PROCEDURE — 3080F DIAST BP >= 90 MM HG: CPT | Performed by: STUDENT IN AN ORGANIZED HEALTH CARE EDUCATION/TRAINING PROGRAM

## 2024-10-14 ASSESSMENT — PATIENT HEALTH QUESTIONNAIRE - PHQ9
10. IF YOU CHECKED OFF ANY PROBLEMS, HOW DIFFICULT HAVE THESE PROBLEMS MADE IT FOR YOU TO DO YOUR WORK, TAKE CARE OF THINGS AT HOME, OR GET ALONG WITH OTHER PEOPLE: SOMEWHAT DIFFICULT
2. FEELING DOWN, DEPRESSED OR HOPELESS: SEVERAL DAYS
SUM OF ALL RESPONSES TO PHQ9 QUESTIONS 1 AND 2: 2
1. LITTLE INTEREST OR PLEASURE IN DOING THINGS: SEVERAL DAYS

## 2024-10-14 NOTE — PROGRESS NOTES
Subjective   Patient ID: Carson Art Jr. is a 49 y.o. male who presents for Establish Care.    HPI   Initial PCP visit.    JANET Art is a 48 yo male with h/o HTN, DLD, GIB in 2024 in setting of NSAID use, severe obesity.    He feels well today.    Patient's father passed away last month, this has been stressful for him.    He works as a pharmacist at [employer]. Lives alone in [place], not , no kids. Never smoker. Minimal alcohol use.    Father had CAD.  Review of Systems  12-point ROS reviewed and was negative unless otherwise noted in HPI.    Objective   /90   Wt (!) 157 kg (347 lb)   BMI 43.37 kg/m²     Physical Exam  GEN: conversant, NAD  HEENT: PERRL, EOMI, MMM  NECK: supple, no carotid bruits appreciated b/l, full  CV: S1, S2, RRR  PULM: CTAB  ABD: soft, NT, obese  NEURO: no new gross focal deficits  EXT: +RLE edema, minimal LLE edema  PSYCH: appropriate affect    Assessment/Plan     #h/o GIB: seeing GI. Avoid NSAIDs.    #HTN: seeing cardiology, continue losartan    #HLD: continue statin. CACS 24 in 2024.    #Suspected VICTORINA: referral to sleep medicine    #Obesity: severe, class 3. Advised graded exercise program, healthy diet, weight loss    #Health maintenance: He is seeing GI, plans to get a colonoscopy. Advised age-appropriate vaccines. Tdap given 2024.    RTC 6 mo for physical

## 2024-10-21 LAB
LABORATORY COMMENT REPORT: NORMAL
PATH REPORT.FINAL DX SPEC: NORMAL
PATH REPORT.GROSS SPEC: NORMAL
PATH REPORT.TOTAL CANCER: NORMAL

## 2024-10-31 ENCOUNTER — APPOINTMENT (OUTPATIENT)
Dept: CARDIOLOGY | Facility: CLINIC | Age: 49
End: 2024-10-31
Payer: COMMERCIAL

## 2024-10-31 VITALS
DIASTOLIC BLOOD PRESSURE: 94 MMHG | HEART RATE: 98 BPM | OXYGEN SATURATION: 97 % | SYSTOLIC BLOOD PRESSURE: 138 MMHG | WEIGHT: 315 LBS | BODY MASS INDEX: 39.17 KG/M2 | HEIGHT: 75 IN

## 2024-10-31 DIAGNOSIS — I25.10 CORONARY ARTERY DISEASE INVOLVING NATIVE CORONARY ARTERY OF NATIVE HEART WITHOUT ANGINA PECTORIS: ICD-10-CM

## 2024-10-31 DIAGNOSIS — E78.5 HYPERLIPIDEMIA, UNSPECIFIED HYPERLIPIDEMIA TYPE: ICD-10-CM

## 2024-10-31 DIAGNOSIS — I87.2 VENOUS INSUFFICIENCY: ICD-10-CM

## 2024-10-31 DIAGNOSIS — I10 PRIMARY HYPERTENSION: Primary | ICD-10-CM

## 2024-10-31 PROCEDURE — 3080F DIAST BP >= 90 MM HG: CPT | Performed by: INTERNAL MEDICINE

## 2024-10-31 PROCEDURE — 3008F BODY MASS INDEX DOCD: CPT | Performed by: INTERNAL MEDICINE

## 2024-10-31 PROCEDURE — 99213 OFFICE O/P EST LOW 20 MIN: CPT | Performed by: INTERNAL MEDICINE

## 2024-10-31 PROCEDURE — 1036F TOBACCO NON-USER: CPT | Performed by: INTERNAL MEDICINE

## 2024-10-31 PROCEDURE — 3075F SYST BP GE 130 - 139MM HG: CPT | Performed by: INTERNAL MEDICINE

## 2024-10-31 RX ORDER — HYDROCHLOROTHIAZIDE 12.5 MG/1
12.5 TABLET ORAL DAILY
Qty: 90 TABLET | Refills: 3 | Status: SHIPPED | OUTPATIENT
Start: 2024-10-31 | End: 2025-10-31

## 2024-10-31 ASSESSMENT — ENCOUNTER SYMPTOMS: DYSPNEA ON EXERTION: 1

## 2024-11-25 ENCOUNTER — LAB (OUTPATIENT)
Dept: LAB | Facility: LAB | Age: 49
End: 2024-11-25
Payer: COMMERCIAL

## 2024-11-25 DIAGNOSIS — E78.5 HYPERLIPIDEMIA, UNSPECIFIED HYPERLIPIDEMIA TYPE: ICD-10-CM

## 2024-11-25 DIAGNOSIS — I10 PRIMARY HYPERTENSION: ICD-10-CM

## 2024-11-25 LAB
ALBUMIN SERPL BCP-MCNC: 3.9 G/DL (ref 3.4–5)
ALP SERPL-CCNC: 151 U/L (ref 33–120)
ALT SERPL W P-5'-P-CCNC: 28 U/L (ref 10–52)
ANION GAP SERPL CALC-SCNC: 14 MMOL/L (ref 10–20)
AST SERPL W P-5'-P-CCNC: 17 U/L (ref 9–39)
BILIRUB SERPL-MCNC: 0.7 MG/DL (ref 0–1.2)
BUN SERPL-MCNC: 14 MG/DL (ref 6–23)
CALCIUM SERPL-MCNC: 8.7 MG/DL (ref 8.6–10.6)
CHLORIDE SERPL-SCNC: 102 MMOL/L (ref 98–107)
CHOLEST SERPL-MCNC: 117 MG/DL (ref 0–199)
CHOLESTEROL/HDL RATIO: 4
CO2 SERPL-SCNC: 28 MMOL/L (ref 21–32)
CREAT SERPL-MCNC: 1.07 MG/DL (ref 0.5–1.3)
EGFRCR SERPLBLD CKD-EPI 2021: 85 ML/MIN/1.73M*2
GLUCOSE SERPL-MCNC: 102 MG/DL (ref 74–99)
HDLC SERPL-MCNC: 29.3 MG/DL
LDLC SERPL CALC-MCNC: 72 MG/DL
NON HDL CHOLESTEROL: 88 MG/DL (ref 0–149)
POTASSIUM SERPL-SCNC: 4.5 MMOL/L (ref 3.5–5.3)
PROT SERPL-MCNC: 6.5 G/DL (ref 6.4–8.2)
SODIUM SERPL-SCNC: 139 MMOL/L (ref 136–145)
TRIGL SERPL-MCNC: 81 MG/DL (ref 0–149)
VLDL: 16 MG/DL (ref 0–40)

## 2024-11-25 PROCEDURE — 80053 COMPREHEN METABOLIC PANEL: CPT

## 2024-11-25 PROCEDURE — 80061 LIPID PANEL: CPT

## 2024-11-25 PROCEDURE — 36415 COLL VENOUS BLD VENIPUNCTURE: CPT

## 2025-01-03 ENCOUNTER — OFFICE VISIT (OUTPATIENT)
Dept: URGENT CARE | Age: 50
End: 2025-01-03
Payer: COMMERCIAL

## 2025-01-03 VITALS
WEIGHT: 315 LBS | TEMPERATURE: 98.5 F | HEART RATE: 88 BPM | OXYGEN SATURATION: 98 % | RESPIRATION RATE: 20 BRPM | BODY MASS INDEX: 41.25 KG/M2 | DIASTOLIC BLOOD PRESSURE: 92 MMHG | SYSTOLIC BLOOD PRESSURE: 135 MMHG

## 2025-01-03 DIAGNOSIS — J32.9 SINOBRONCHITIS: Primary | ICD-10-CM

## 2025-01-03 DIAGNOSIS — J40 SINOBRONCHITIS: Primary | ICD-10-CM

## 2025-01-03 DIAGNOSIS — R68.89 FLU-LIKE SYMPTOMS: ICD-10-CM

## 2025-01-03 LAB
POC RAPID INFLUENZA A: NEGATIVE
POC RAPID INFLUENZA B: NEGATIVE
POC SARS-COV-2 AG BINAX: NORMAL

## 2025-01-03 RX ORDER — PREDNISONE 20 MG/1
40 TABLET ORAL DAILY
Qty: 10 TABLET | Refills: 0 | Status: SHIPPED | OUTPATIENT
Start: 2025-01-03 | End: 2025-01-08

## 2025-01-03 RX ORDER — DOXYCYCLINE 100 MG/1
100 CAPSULE ORAL 2 TIMES DAILY
Qty: 14 CAPSULE | Refills: 0 | Status: SHIPPED | OUTPATIENT
Start: 2025-01-03 | End: 2025-01-10

## 2025-01-03 RX ORDER — ALBUTEROL SULFATE 90 UG/1
2 INHALANT RESPIRATORY (INHALATION) EVERY 4 HOURS PRN
Qty: 8.5 G | Refills: 0 | Status: SHIPPED | OUTPATIENT
Start: 2025-01-03 | End: 2026-01-03

## 2025-01-03 ASSESSMENT — ENCOUNTER SYMPTOMS
HEADACHES: 1
SORE THROAT: 1
COUGH: 1
CHILLS: 1
WHEEZING: 1
FATIGUE: 1
SINUS PRESSURE: 1
CHEST TIGHTNESS: 0
SHORTNESS OF BREATH: 0
FEVER: 1
ACTIVITY CHANGE: 1
SINUS PAIN: 1

## 2025-01-03 ASSESSMENT — PAIN SCALES - GENERAL: PAINLEVEL_OUTOF10: 0-NO PAIN

## 2025-01-03 NOTE — PROGRESS NOTES
Subjective   Patient ID: Carson Art Jr. is a 49 y.o. male. They present today with a chief complaint of URI (Nausea, sinus congestion/drainage, body aches, fever X 4 days. ).    History of Present Illness    URI  Presenting symptoms: congestion, cough, fatigue, fever and sore throat    Presenting symptoms: no ear pain    Associated symptoms: headaches, sinus pain and wheezing          Presents to urgent care for complaints of sinus congestion, cough, body aches, and sinus pressure.  Patient states today he had a temperature of 100.2 °F this morning.  He also reports increasing sinus pressure that started today.  Reports last night he felt like he was wheezing when he was laying down.  Past Medical History  Allergies as of 01/03/2025 - Reviewed 01/03/2025   Allergen Reaction Noted    Nsaids (non-steroidal anti-inflammatory drug) GI bleeding 05/14/2024       (Not in a hospital admission)       Past Medical History:   Diagnosis Date    Hyperlipidemia     Hypertension     Patient denies medical problems     Seasonal allergies        Past Surgical History:   Procedure Laterality Date    ESOPHAGOGASTRODUODENOSCOPY  03/2024    WISDOM TOOTH EXTRACTION          reports that he has quit smoking. His smoking use included cigars. He has been exposed to tobacco smoke. He has never used smokeless tobacco. He reports current alcohol use. He reports that he does not use drugs.    Review of Systems  Review of Systems   Constitutional:  Positive for activity change, chills, fatigue and fever.   HENT:  Positive for congestion, postnasal drip, sinus pressure, sinus pain and sore throat. Negative for ear pain.    Respiratory:  Positive for cough and wheezing. Negative for chest tightness and shortness of breath.    Cardiovascular:  Negative for chest pain.   Neurological:  Positive for headaches.                                  Objective    Vitals:    01/03/25 1507   BP: (!) 135/92   Pulse: 88   Resp: 20   Temp: 36.9 °C (98.5 °F)    SpO2: 98%   Weight: 150 kg (330 lb)     No LMP for male patient.    Physical Exam  Vitals reviewed.   Constitutional:       Appearance: Normal appearance.   HENT:      Head: Normocephalic.      Right Ear: Tympanic membrane, ear canal and external ear normal.      Left Ear: Tympanic membrane, ear canal and external ear normal.      Nose: Congestion present.      Right Sinus: Maxillary sinus tenderness present.      Left Sinus: Maxillary sinus tenderness present.      Mouth/Throat:      Mouth: Mucous membranes are moist.      Pharynx: Postnasal drip present.   Cardiovascular:      Rate and Rhythm: Normal rate and regular rhythm.      Heart sounds: Normal heart sounds.   Pulmonary:      Breath sounds: Examination of the right-upper field reveals wheezing. Examination of the left-upper field reveals wheezing. Wheezing present.      Comments: All other lobes clear  Skin:     General: Skin is warm and dry.   Neurological:      Mental Status: He is alert.         Procedures    Point of Care Test & Imaging Results from this visit  No results found for this visit on 01/03/25.   No results found.    Diagnostic study results (if any) were reviewed by OUMAR Fiore.    Assessment/Plan   Allergies, medications, history, and pertinent labs/EKGs/Imaging reviewed by OUMAR Fiore.     Medical Decision Making  Rapid COVID and flu were both negative.  On physical exam patient did have expiratory wheezing in upper lobes.  Will start patient on albuterol, prednisone, and doxycycline for sinobronchitis.  Patient was told to follow-up with his family doctor if symptoms are not improving or worsening in the next week.    At time of discharge patient was clinically well-appearing and HDS for outpatient management. The patient and/or family was educated regarding diagnosis, supportive care, OTC and Rx medications. The patient and/or family was given the opportunity to ask questions prior to discharge.  They  verbalized understanding of my discussion of the plans for treatment, expected course, indications to return to  or seek further evaluation in ED, and the need for timely follow up as directed.   They were provided with a work/school excuse if requested.      Orders and Diagnoses  Diagnoses and all orders for this visit:  Sinobronchitis  -     albuterol (ProAir HFA) 90 mcg/actuation inhaler; Inhale 2 puffs every 4 hours if needed for wheezing or shortness of breath.  -     doxycycline (Vibramycin) 100 mg capsule; Take 1 capsule (100 mg) by mouth 2 times a day for 7 days. Take with at least 8 ounces (large glass) of water, do not lie down for 30 minutes after  -     predniSONE (Deltasone) 20 mg tablet; Take 2 tablets (40 mg) by mouth once daily for 5 days.  Flu-like symptoms  -     POCT Covid-19 Rapid Antigen  -     POCT Influenza A/B manually resulted      Medical Admin Record      Patient disposition: Home    Electronically signed by OUMAR Fiore  3:49 PM

## 2025-04-08 DIAGNOSIS — Z12.11 COLON CANCER SCREENING: Primary | ICD-10-CM

## 2025-04-14 ENCOUNTER — APPOINTMENT (OUTPATIENT)
Dept: PRIMARY CARE | Facility: CLINIC | Age: 50
End: 2025-04-14
Payer: COMMERCIAL

## 2025-04-14 VITALS — DIASTOLIC BLOOD PRESSURE: 74 MMHG | SYSTOLIC BLOOD PRESSURE: 100 MMHG | BODY MASS INDEX: 43.12 KG/M2 | WEIGHT: 315 LBS

## 2025-04-14 DIAGNOSIS — H61.23 BILATERAL IMPACTED CERUMEN: ICD-10-CM

## 2025-04-14 DIAGNOSIS — R29.818 SUSPECTED SLEEP APNEA: ICD-10-CM

## 2025-04-14 DIAGNOSIS — Z00.00 ENCOUNTER FOR PREVENTIVE HEALTH EXAMINATION: Primary | ICD-10-CM

## 2025-04-14 DIAGNOSIS — Z12.11 COLON CANCER SCREENING: Primary | ICD-10-CM

## 2025-04-14 DIAGNOSIS — I10 PRIMARY HYPERTENSION: ICD-10-CM

## 2025-04-14 DIAGNOSIS — Z00.00 HEALTHCARE MAINTENANCE: ICD-10-CM

## 2025-04-14 DIAGNOSIS — Z12.5 SCREENING FOR PROSTATE CANCER: ICD-10-CM

## 2025-04-14 DIAGNOSIS — E78.5 HYPERLIPIDEMIA, UNSPECIFIED HYPERLIPIDEMIA TYPE: ICD-10-CM

## 2025-04-14 PROCEDURE — 99396 PREV VISIT EST AGE 40-64: CPT | Performed by: STUDENT IN AN ORGANIZED HEALTH CARE EDUCATION/TRAINING PROGRAM

## 2025-04-14 PROCEDURE — 3074F SYST BP LT 130 MM HG: CPT | Performed by: STUDENT IN AN ORGANIZED HEALTH CARE EDUCATION/TRAINING PROGRAM

## 2025-04-14 PROCEDURE — 69209 REMOVE IMPACTED EAR WAX UNI: CPT | Performed by: STUDENT IN AN ORGANIZED HEALTH CARE EDUCATION/TRAINING PROGRAM

## 2025-04-14 PROCEDURE — 3078F DIAST BP <80 MM HG: CPT | Performed by: STUDENT IN AN ORGANIZED HEALTH CARE EDUCATION/TRAINING PROGRAM

## 2025-04-14 RX ORDER — LOSARTAN POTASSIUM 100 MG/1
100 TABLET ORAL DAILY
Qty: 90 TABLET | Refills: 3 | Status: SHIPPED | OUTPATIENT
Start: 2025-04-14 | End: 2026-04-14

## 2025-04-14 RX ORDER — SODIUM, POTASSIUM,MAG SULFATES 17.5-3.13G
SOLUTION, RECONSTITUTED, ORAL ORAL
Qty: 354 ML | Refills: 0 | Status: SHIPPED | OUTPATIENT
Start: 2025-04-14

## 2025-04-14 NOTE — PROGRESS NOTES
Subjective   Patient ID: Carson Art Jr. is a 50 y.o. male who presents for Follow-up and Med Refill.    HPI   Yearly physical.    Med refill.    BP has been stable.    He feels well physically.    He is going to the [facility] regularly for exercise, has successfully lost 12 lbs.    He has decreased hearing in ears b/l, thinks he has cerumen impaction.  Review of Systems  12-point ROS reviewed and was negative unless otherwise noted in HPI.    Objective   /74   Wt (!) 156 kg (345 lb)   BMI 43.12 kg/m²     Physical Exam  GEN: conversant, NAD  HEENT: PERRL, EOMI, MMM, TMs occluded by cerumen b/l  NECK: supple, no carotid bruits appreciated b/l  CV: S1, S2, RRR  PULM: CTAB  ABD: soft, NT, ND  NEURO: no new gross focal deficits  EXT: no sig LE edema  PSYCH: appropriate affect    Assessment/Plan     #h/o GIB: seeing GI. Avoid NSAIDs.     #HTN: seeing cardiology, continue losartan     #HLD: continue statin. CACS 24 in 2024.     #Suspected VICTORINA: referral to sleep medicine     #Obesity: severe, class 3. Advised graded exercise program, healthy diet, weight loss    #Cerumen impaction: b/l. R/b/a of ear lavage discussed with patient, who opted to proceed with Lavage. Lavage was completed successfully with intact TMs visualized b/l.     #Health maintenance: He is seeing GI, plans to get a colonoscopy. Advised age-appropriate vaccines. Tdap given 2024.     RTC 6 mo

## 2025-04-26 LAB
ALBUMIN SERPL-MCNC: 3.7 G/DL (ref 3.6–5.1)
ALP SERPL-CCNC: 130 U/L (ref 35–144)
ALT SERPL-CCNC: 23 U/L (ref 9–46)
ANION GAP SERPL CALCULATED.4IONS-SCNC: 7 MMOL/L (CALC) (ref 7–17)
AST SERPL-CCNC: 18 U/L (ref 10–35)
BILIRUB SERPL-MCNC: 0.6 MG/DL (ref 0.2–1.2)
BUN SERPL-MCNC: 14 MG/DL (ref 7–25)
CALCIUM SERPL-MCNC: 8.5 MG/DL (ref 8.6–10.3)
CHLORIDE SERPL-SCNC: 108 MMOL/L (ref 98–110)
CO2 SERPL-SCNC: 26 MMOL/L (ref 20–32)
CREAT SERPL-MCNC: 1.07 MG/DL (ref 0.7–1.3)
EGFRCR SERPLBLD CKD-EPI 2021: 85 ML/MIN/1.73M2
ERYTHROCYTE [DISTWIDTH] IN BLOOD BY AUTOMATED COUNT: 14.4 % (ref 11–15)
EST. AVERAGE GLUCOSE BLD GHB EST-MCNC: 126 MG/DL
EST. AVERAGE GLUCOSE BLD GHB EST-SCNC: 7 MMOL/L
GLUCOSE SERPL-MCNC: 94 MG/DL (ref 65–99)
HBA1C MFR BLD: 6 %
HCT VFR BLD AUTO: 41.8 % (ref 38.5–50)
HGB BLD-MCNC: 13.5 G/DL (ref 13.2–17.1)
MCH RBC QN AUTO: 29 PG (ref 27–33)
MCHC RBC AUTO-ENTMCNC: 32.3 G/DL (ref 32–36)
MCV RBC AUTO: 89.7 FL (ref 80–100)
PLATELET # BLD AUTO: 329 THOUSAND/UL (ref 140–400)
PMV BLD REES-ECKER: 10.7 FL (ref 7.5–12.5)
POTASSIUM SERPL-SCNC: 4.5 MMOL/L (ref 3.5–5.3)
PROT SERPL-MCNC: 6.2 G/DL (ref 6.1–8.1)
PSA SERPL-MCNC: 0.7 NG/ML
RBC # BLD AUTO: 4.66 MILLION/UL (ref 4.2–5.8)
SODIUM SERPL-SCNC: 141 MMOL/L (ref 135–146)
TSH SERPL-ACNC: 4.2 MIU/L (ref 0.4–4.5)
WBC # BLD AUTO: 6.8 THOUSAND/UL (ref 3.8–10.8)

## 2025-05-07 ENCOUNTER — ANESTHESIA EVENT (OUTPATIENT)
Dept: GASTROENTEROLOGY | Facility: EXTERNAL LOCATION | Age: 50
End: 2025-05-07

## 2025-05-15 ENCOUNTER — ANESTHESIA (OUTPATIENT)
Dept: GASTROENTEROLOGY | Facility: EXTERNAL LOCATION | Age: 50
End: 2025-05-15

## 2025-05-15 ENCOUNTER — APPOINTMENT (OUTPATIENT)
Dept: GASTROENTEROLOGY | Facility: EXTERNAL LOCATION | Age: 50
End: 2025-05-15
Payer: COMMERCIAL

## 2025-05-15 VITALS
OXYGEN SATURATION: 96 % | TEMPERATURE: 98.1 F | SYSTOLIC BLOOD PRESSURE: 112 MMHG | DIASTOLIC BLOOD PRESSURE: 78 MMHG | RESPIRATION RATE: 12 BRPM | HEART RATE: 75 BPM

## 2025-05-15 DIAGNOSIS — Z12.11 COLON CANCER SCREENING: ICD-10-CM

## 2025-05-15 PROCEDURE — 45378 DIAGNOSTIC COLONOSCOPY: CPT | Performed by: INTERNAL MEDICINE

## 2025-05-15 RX ORDER — PROPOFOL 10 MG/ML
INJECTION, EMULSION INTRAVENOUS AS NEEDED
Status: DISCONTINUED | OUTPATIENT
Start: 2025-05-15 | End: 2025-05-15

## 2025-05-15 RX ORDER — LIDOCAINE HYDROCHLORIDE 20 MG/ML
INJECTION, SOLUTION INFILTRATION; PERINEURAL AS NEEDED
Status: DISCONTINUED | OUTPATIENT
Start: 2025-05-15 | End: 2025-05-15

## 2025-05-15 RX ORDER — SODIUM CHLORIDE 9 MG/ML
INJECTION, SOLUTION INTRAVENOUS CONTINUOUS PRN
Status: DISCONTINUED | OUTPATIENT
Start: 2025-05-15 | End: 2025-05-15

## 2025-05-15 RX ADMIN — PROPOFOL 50 MG: 10 INJECTION, EMULSION INTRAVENOUS at 10:03

## 2025-05-15 RX ADMIN — SODIUM CHLORIDE: 9 INJECTION, SOLUTION INTRAVENOUS at 09:51

## 2025-05-15 RX ADMIN — LIDOCAINE HYDROCHLORIDE 2.5 ML: 20 INJECTION, SOLUTION INFILTRATION; PERINEURAL at 09:51

## 2025-05-15 RX ADMIN — PROPOFOL 50 MG: 10 INJECTION, EMULSION INTRAVENOUS at 10:00

## 2025-05-15 RX ADMIN — PROPOFOL 100 MG: 10 INJECTION, EMULSION INTRAVENOUS at 09:51

## 2025-05-15 RX ADMIN — PROPOFOL 50 MG: 10 INJECTION, EMULSION INTRAVENOUS at 09:58

## 2025-05-15 RX ADMIN — PROPOFOL 50 MG: 10 INJECTION, EMULSION INTRAVENOUS at 09:56

## 2025-05-15 RX ADMIN — PROPOFOL 50 MG: 10 INJECTION, EMULSION INTRAVENOUS at 09:54

## 2025-05-15 SDOH — HEALTH STABILITY: MENTAL HEALTH: CURRENT SMOKER: 0

## 2025-05-15 ASSESSMENT — COLUMBIA-SUICIDE SEVERITY RATING SCALE - C-SSRS
6. HAVE YOU EVER DONE ANYTHING, STARTED TO DO ANYTHING, OR PREPARED TO DO ANYTHING TO END YOUR LIFE?: NO
2. HAVE YOU ACTUALLY HAD ANY THOUGHTS OF KILLING YOURSELF?: NO
1. IN THE PAST MONTH, HAVE YOU WISHED YOU WERE DEAD OR WISHED YOU COULD GO TO SLEEP AND NOT WAKE UP?: NO

## 2025-05-15 ASSESSMENT — PAIN SCALES - GENERAL
PAINLEVEL_OUTOF10: 0 - NO PAIN
PAIN_LEVEL: 0
PAINLEVEL_OUTOF10: 0 - NO PAIN

## 2025-05-15 ASSESSMENT — PAIN - FUNCTIONAL ASSESSMENT
PAIN_FUNCTIONAL_ASSESSMENT: 0-10

## 2025-05-15 NOTE — ANESTHESIA PREPROCEDURE EVALUATION
Patient: Carson Art Jr.    Procedure Information       Date/Time: 05/15/25 0950    Scheduled providers: Ilan Bell MD    Procedure: COLONOSCOPY    Location: [facility]            Relevant Problems   Anesthesia (within normal limits)      Cardiac   (+) Coronary artery disease involving native coronary artery of native heart without angina pectoris   (+) Hyperlipidemia   (+) Primary hypertension      Pulmonary (within normal limits)      Neuro (within normal limits)   (+) Transient ischemic attack      GI   (+) GI bleed      /Renal (within normal limits)      Liver (within normal limits)      Endocrine (within normal limits)      Hematology   (+) Iron deficiency anemia      Musculoskeletal (within normal limits)      HEENT (within normal limits)      ID (within normal limits)      Skin (within normal limits)      GYN (within normal limits)       Clinical information reviewed:   Tobacco  Allergies  Meds  Problems  Med Hx  Surg Hx   Fam Hx  Soc   Hx        NPO Detail:  NPO/Void Status  Carbohydrate Drink Given Prior to Surgery? : N  Date of Last Liquid: 05/15/25  Time of Last Liquid: 0500  Date of Last Solid: 05/13/25  Time of Last Solid: 1900  Last Intake Type: Clear fluids  Time of Last Void: 0911         Physical Exam    Airway  Mallampati: II  TM distance: >3 FB  Neck ROM: full     Cardiovascular   Rhythm: regular  Rate: normal     Dental    Pulmonary Breath sounds clear to auscultation     Abdominal Abdomen: soft  Bowel sounds: normal           Anesthesia Plan    History of general anesthesia?: yes  History of complications of general anesthesia?: no    ASA 3     MAC     The patient is not a current smoker.  Patient was not previously instructed to abstain from smoking on day of procedure.  Education provided regarding risk of obstructive sleep apnea.  intravenous induction   Anesthetic plan and risks discussed with patient.    Plan discussed with CRNA.

## 2025-05-15 NOTE — DISCHARGE INSTRUCTIONS

## 2025-05-15 NOTE — H&P
Procedure H&P    Patient Profile-Procedures  Name Carson Art Jr.  Date of Birth 1975  MRN [id number]  Address   [address] [id number] [facility] [address]    Primary Phone Number [phone]  Secondary Phone Number    Thony Bañuelos    Procedure(s):  Procedures: Colonoscopy  Primary contact name and number   Extended Emergency Contact Information  Primary Emergency Contact: Carson Art  Home Phone: [phone]  Relation: Parent    General Health  Weight There were no vitals filed for this visit.  BMI There is no height or weight on file to calculate BMI.    Allergies  RX Allergies[1]    Past Medical History   Medical History[2]    Provider assessment  Diagnosis: Colon Cancer Screening/Surveillance   Medication Reviewed - yes  Prior to Admission medications    Medication Sig Start Date End Date Taking? Authorizing Provider   albuterol (ProAir HFA) 90 mcg/actuation inhaler Inhale 2 puffs every 4 hours if needed for wheezing or shortness of breath. 1/3/25 1/3/26 Yes ELISA Fiore-CNP   atorvastatin (Lipitor) 40 mg tablet Take 1 tablet (40 mg) by mouth once daily. 5/30/24 5/30/25 Yes Rick Zhou MD   hydroCHLOROthiazide (Microzide) 12.5 mg tablet Take 1 tablet (12.5 mg) by mouth once daily. 10/31/24 10/31/25 Yes Rick Zhou MD   loratadine (Claritin) 10 mg tablet Take 1 tablet (10 mg) by mouth once daily.   Yes Historical Provider, MD   losartan (Cozaar) 100 mg tablet Take 1 tablet (100 mg) by mouth once daily. 4/14/25 4/14/26 Yes Terrell Byrd MD   Pain Relief ES, acetaminophen, 500 mg tablet every 6 hours if needed. 2/5/24  Yes Historical Provider, MD   omeprazole (PriLOSEC) 40 mg DR capsule Take 1 capsule (40 mg) by mouth 2 times a day before meals. Do not crush or chew. 6/14/24 4/14/25  Ilan Bell MD   sodium,potassium,mag sulfates (Suprep) 17.5-3.13-1.6 gram solution Take one bottle twice as directed by the prep instructions 4/14/25 5/15/25   Ilan Bell MD       Physical Exam  Vitals:    05/15/25 0913   BP: 124/77   Pulse: 84   Resp: 18   Temp:         General: A&Ox3, NAD.  HEENT: AT/NC.   CV: RRR. No murmur.  Resp: CTA bilaterally. No wheezing, rhonchi or rales.   GI: Soft, NT/ND. BSx4.  Extrem: No edema. Pulses intact.  Neuro: No focal deficits.   Psych: Normal mood and affect.      Procedure Plan - pre-procedural (re)assesment completed by physician:  discharge/transfer patient when discharge criteria met    ASA status 3  Mallampati score 2    Ilan Bell MD  5/15/2025 9:48 AM           [1]   Allergies  Allergen Reactions    Nsaids (Non-Steroidal Anti-Inflammatory Drug) GI bleeding   [2]   Past Medical History:  Diagnosis Date    Hyperlipidemia     Hypertension     Patient denies medical problems     Seasonal allergies

## 2025-05-15 NOTE — ANESTHESIA POSTPROCEDURE EVALUATION
Patient: Carson Art Jr.    Procedure Summary       Date: 05/15/25 Room / Location: [facility]    Anesthesia Start: 0948 Anesthesia Stop:     Procedure: COLONOSCOPY Diagnosis: Colon cancer screening    Scheduled Providers: Ilan Bell MD Responsible Provider: DOROTHEA Londono    Anesthesia Type: MAC ASA Status: 3            Anesthesia Type: MAC    Vitals Value Taken Time   /83 05/15/25 10:10   Temp 36.7 °C (98.1 °F) 05/15/25 10:10   Pulse 74 05/15/25 10:10   Resp 14 05/15/25 10:10   SpO2 96 % 05/15/25 10:10       Anesthesia Post Evaluation    Patient location during evaluation: bedside  Patient participation: complete - patient participated  Level of consciousness: awake  Pain score: 0  Pain management: adequate  Airway patency: patent  Cardiovascular status: acceptable  Respiratory status: acceptable  Hydration status: acceptable  Postoperative Nausea and Vomiting: none        No notable events documented.

## 2025-06-23 ENCOUNTER — APPOINTMENT (OUTPATIENT)
Dept: PULMONOLOGY | Facility: CLINIC | Age: 50
End: 2025-06-23
Payer: COMMERCIAL

## 2025-06-23 VITALS — WEIGHT: 315 LBS | BODY MASS INDEX: 39.17 KG/M2 | HEIGHT: 75 IN

## 2025-06-23 DIAGNOSIS — Z78.9: ICD-10-CM

## 2025-06-23 DIAGNOSIS — R29.818 SUSPECTED SLEEP APNEA: ICD-10-CM

## 2025-06-23 DIAGNOSIS — R63.4 WEIGHT LOSS: Primary | ICD-10-CM

## 2025-06-23 PROCEDURE — 99204 OFFICE O/P NEW MOD 45 MIN: CPT | Performed by: INTERNAL MEDICINE

## 2025-06-23 PROCEDURE — 3008F BODY MASS INDEX DOCD: CPT | Performed by: INTERNAL MEDICINE

## 2025-06-23 PROCEDURE — 1036F TOBACCO NON-USER: CPT | Performed by: INTERNAL MEDICINE

## 2025-06-23 RX ORDER — ESOMEPRAZOLE MAGNESIUM 40 MG/1
40 CAPSULE, DELAYED RELEASE ORAL DAILY PRN
COMMUNITY

## 2025-06-23 NOTE — PROGRESS NOTES
Subjective   Patient ID: Carson Art Jr. is a 50 y.o. male who presents for assessment of possible obstructive sleep apnea..  HPI  Patient reports that he has been losing weight with exercise he does use a CPAP device during the night.  He also reports choking or gasping during sleep it is uncommon for him.  He only has 3-4 episodes that occur during couple months period of time.  He denies any morning headaches.  He denies daytime sleepiness.  His ESS is 11.  Patient goes to bed at 11 PM and is generally up at for to 9 AM.  Patient does not have significant naps during the day and has no bed partner.  He is residing in [facility].  Patient denies having morning headaches.  He does report that he will take a short nap during the day.  This generally is for only 1 hour.  He has been told that he snores at nighttime.  Patient does not awaken gasping for breath or choking.  He denies feeling sleepy during the day.  He also denies falling asleep when driving a vehicle.  He is generally not tired during the day.  Patient is being treated for hypertension.  Patient's mother is being treated for COPD and hypertension.  Father has history of a CVA and renal failure.  Patient is a smoker in the remote past.  Review of Systems  Patient's weight is decreased to 332 pounds.  He reports that he feels some energy back compared to prior evaluations.  All other review of systems were noncontributory.  Objective   Physical Exam  Not done today.  Assessment/Plan        Impressions:  1.  Obstructive sleep apnea.  Recommendations:  1.  Continue to pursue weight loss regimen and exercise.      This note was transcribed using the Dragon Dictation system.  There may be grammatical, punctuation, or verbiage errors that occur with voice recognition programs.    Thony Marinelli,  06/23/25 4:43 PM

## 2025-07-01 DIAGNOSIS — E78.5 HYPERLIPIDEMIA, UNSPECIFIED HYPERLIPIDEMIA TYPE: Primary | ICD-10-CM

## 2025-07-01 RX ORDER — ATORVASTATIN CALCIUM 40 MG/1
40 TABLET, FILM COATED ORAL DAILY
Qty: 90 TABLET | Refills: 3 | Status: SHIPPED | OUTPATIENT
Start: 2025-07-01 | End: 2026-07-01

## 2025-10-13 ENCOUNTER — APPOINTMENT (OUTPATIENT)
Dept: PRIMARY CARE | Facility: CLINIC | Age: 50
End: 2025-10-13
Payer: COMMERCIAL

## 2025-11-10 ENCOUNTER — APPOINTMENT (OUTPATIENT)
Dept: CARDIOLOGY | Facility: CLINIC | Age: 50
End: 2025-11-10
Payer: COMMERCIAL